# Patient Record
Sex: MALE | Race: WHITE | NOT HISPANIC OR LATINO | Employment: OTHER | URBAN - METROPOLITAN AREA
[De-identification: names, ages, dates, MRNs, and addresses within clinical notes are randomized per-mention and may not be internally consistent; named-entity substitution may affect disease eponyms.]

---

## 2017-07-13 ENCOUNTER — APPOINTMENT (OUTPATIENT)
Dept: CT IMAGING | Facility: HOSPITAL | Age: 82
End: 2017-07-13

## 2017-07-13 ENCOUNTER — APPOINTMENT (OUTPATIENT)
Dept: GENERAL RADIOLOGY | Facility: HOSPITAL | Age: 82
End: 2017-07-13

## 2017-07-13 ENCOUNTER — HOSPITAL ENCOUNTER (INPATIENT)
Facility: HOSPITAL | Age: 82
LOS: 2 days | Discharge: HOME HEALTH | End: 2017-07-15
Attending: EMERGENCY MEDICINE | Admitting: INTERNAL MEDICINE

## 2017-07-13 DIAGNOSIS — R55 SYNCOPE AND COLLAPSE: ICD-10-CM

## 2017-07-13 DIAGNOSIS — R26.2 DIFFICULTY WALKING: ICD-10-CM
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
## 2017-07-13 DIAGNOSIS — S09.90XA HEAD INJURY, INITIAL ENCOUNTER: ICD-10-CM

## 2017-07-13 DIAGNOSIS — N39.0 ACUTE UTI: ICD-10-CM

## 2017-07-13 DIAGNOSIS — D72.829 LEUKOCYTOSIS, UNSPECIFIED TYPE: ICD-10-CM

## 2017-07-13 DIAGNOSIS — R55 SYNCOPE, UNSPECIFIED SYNCOPE TYPE: ICD-10-CM

## 2017-07-13 DIAGNOSIS — S50.311A ELBOW ABRASION, RIGHT, INITIAL ENCOUNTER: ICD-10-CM

## 2017-07-13 PROBLEM — T79.6XXA TRAUMATIC RHABDOMYOLYSIS (HCC): Status: ACTIVE | Noted: 2017-07-13

## 2017-07-13 PROBLEM — Z95.0 PACEMAKER, ARTIFICIAL: Status: ACTIVE | Noted: 2017-07-13

## 2017-07-13 PROBLEM — A41.9 SEPSIS DUE TO URINARY TRACT INFECTION (HCC): Status: ACTIVE | Noted: 2017-07-13

## 2017-07-13 PROBLEM — I25.5 ISCHEMIC CARDIOMYOPATHY: Status: ACTIVE | Noted: 2017-07-13

## 2017-07-13 LAB
ABO GROUP BLD: NORMAL
ALBUMIN SERPL-MCNC: 3.5 G/DL (ref 3.5–5.2)
ALBUMIN/GLOB SERPL: 1.3 G/DL
ALP SERPL-CCNC: 75 U/L (ref 39–117)
ALT SERPL W P-5'-P-CCNC: 25 U/L (ref 1–41)
ANION GAP SERPL CALCULATED.3IONS-SCNC: 11.7 MMOL/L
AST SERPL-CCNC: 45 U/L (ref 1–40)
BACTERIA UR QL AUTO: ABNORMAL /HPF
BASOPHILS # BLD AUTO: 0.01 10*3/MM3 (ref 0–0.2)
BASOPHILS NFR BLD AUTO: 0 % (ref 0–1.5)
BILIRUB SERPL-MCNC: 0.5 MG/DL (ref 0.1–1.2)
BILIRUB UR QL STRIP: NEGATIVE
BLD GP AB SCN SERPL QL: NEGATIVE
BUN BLD-MCNC: 19 MG/DL (ref 8–23)
BUN/CREAT SERPL: 17.8 (ref 7–25)
CALCIUM SPEC-SCNC: 9.7 MG/DL (ref 8.6–10.5)
CHLORIDE SERPL-SCNC: 102 MMOL/L (ref 98–107)
CK SERPL-CCNC: 484 U/L (ref 20–200)
CLARITY UR: ABNORMAL
CO2 SERPL-SCNC: 27.3 MMOL/L (ref 22–29)
COLOR UR: ABNORMAL
CREAT BLD-MCNC: 1.07 MG/DL (ref 0.76–1.27)
D-LACTATE SERPL-SCNC: 1.1 MMOL/L (ref 0.5–2)
D-LACTATE SERPL-SCNC: 1.5 MMOL/L (ref 0.5–2)
DEPRECATED RDW RBC AUTO: 48.9 FL (ref 37–54)
EOSINOPHIL # BLD AUTO: 0 10*3/MM3 (ref 0–0.7)
EOSINOPHIL NFR BLD AUTO: 0 % (ref 0.3–6.2)
ERYTHROCYTE [DISTWIDTH] IN BLOOD BY AUTOMATED COUNT: 14.6 % (ref 11.5–14.5)
GFR SERPL CREATININE-BSD FRML MDRD: 66 ML/MIN/1.73
GLOBULIN UR ELPH-MCNC: 2.8 GM/DL
GLUCOSE BLD-MCNC: 121 MG/DL (ref 65–99)
GLUCOSE UR STRIP-MCNC: NEGATIVE MG/DL
HCT VFR BLD AUTO: 43.7 % (ref 40.4–52.2)
HGB BLD-MCNC: 14.1 G/DL (ref 13.7–17.6)
HGB UR QL STRIP.AUTO: NEGATIVE
HYALINE CASTS UR QL AUTO: ABNORMAL /LPF
IMM GRANULOCYTES # BLD: 0.06 10*3/MM3 (ref 0–0.03)
IMM GRANULOCYTES NFR BLD: 0.3 % (ref 0–0.5)
INR PPP: 1.25 (ref 0.9–1.1)
KETONES UR QL STRIP: ABNORMAL
LEUKOCYTE ESTERASE UR QL STRIP.AUTO: ABNORMAL
LYMPHOCYTES # BLD AUTO: 1.16 10*3/MM3 (ref 0.9–4.8)
LYMPHOCYTES NFR BLD AUTO: 4.8 % (ref 19.6–45.3)
MAGNESIUM SERPL-MCNC: 2.2 MG/DL (ref 1.6–2.4)
MCH RBC QN AUTO: 29.4 PG (ref 27–32.7)
MCHC RBC AUTO-ENTMCNC: 32.3 G/DL (ref 32.6–36.4)
MCV RBC AUTO: 91 FL (ref 79.8–96.2)
MONOCYTES # BLD AUTO: 0.66 10*3/MM3 (ref 0.2–1.2)
MONOCYTES NFR BLD AUTO: 2.8 % (ref 5–12)
NEUTROPHILS # BLD AUTO: 22.07 10*3/MM3 (ref 1.9–8.1)
NEUTROPHILS NFR BLD AUTO: 92.1 % (ref 42.7–76)
NITRITE UR QL STRIP: NEGATIVE
NT-PROBNP SERPL-MCNC: 2558 PG/ML (ref 0–1800)
PH UR STRIP.AUTO: 8 [PH] (ref 5–8)
PLATELET # BLD AUTO: 174 10*3/MM3 (ref 140–500)
PMV BLD AUTO: 10.3 FL (ref 6–12)
POTASSIUM BLD-SCNC: 4.2 MMOL/L (ref 3.5–5.2)
PROT SERPL-MCNC: 6.3 G/DL (ref 6–8.5)
PROT UR QL STRIP: ABNORMAL
PROTHROMBIN TIME: 15.2 SECONDS (ref 11.7–14.2)
RBC # BLD AUTO: 4.8 10*6/MM3 (ref 4.6–6)
RBC # UR: ABNORMAL /HPF
REF LAB TEST METHOD: ABNORMAL
RH BLD: POSITIVE
SODIUM BLD-SCNC: 141 MMOL/L (ref 136–145)
SP GR UR STRIP: 1.02 (ref 1–1.03)
SQUAMOUS #/AREA URNS HPF: ABNORMAL /HPF
TROPONIN T SERPL-MCNC: <0.01 NG/ML (ref 0–0.03)
UROBILINOGEN UR QL STRIP: ABNORMAL
WBC NRBC COR # BLD: 23.96 10*3/MM3 (ref 4.5–10.7)
WBC UR QL AUTO: ABNORMAL /HPF

## 2017-07-13 PROCEDURE — 85610 PROTHROMBIN TIME: CPT | Performed by: EMERGENCY MEDICINE

## 2017-07-13 PROCEDURE — 87040 BLOOD CULTURE FOR BACTERIA: CPT | Performed by: INTERNAL MEDICINE

## 2017-07-13 PROCEDURE — 36415 COLL VENOUS BLD VENIPUNCTURE: CPT | Performed by: INTERNAL MEDICINE

## 2017-07-13 PROCEDURE — 83605 ASSAY OF LACTIC ACID: CPT | Performed by: EMERGENCY MEDICINE

## 2017-07-13 PROCEDURE — 81001 URINALYSIS AUTO W/SCOPE: CPT | Performed by: EMERGENCY MEDICINE

## 2017-07-13 PROCEDURE — 25010000003 CEFTRIAXONE PER 250 MG: Performed by: EMERGENCY MEDICINE

## 2017-07-13 PROCEDURE — 25010000002 CEFTRIAXONE PER 250 MG: Performed by: INTERNAL MEDICINE

## 2017-07-13 PROCEDURE — 70450 CT HEAD/BRAIN W/O DYE: CPT

## 2017-07-13 PROCEDURE — 84484 ASSAY OF TROPONIN QUANT: CPT | Performed by: EMERGENCY MEDICINE

## 2017-07-13 PROCEDURE — 25010000002 TDAP 5-2.5-18.5 LF-MCG/0.5 SUSPENSION: Performed by: EMERGENCY MEDICINE

## 2017-07-13 PROCEDURE — 85025 COMPLETE CBC W/AUTO DIFF WBC: CPT | Performed by: EMERGENCY MEDICINE

## 2017-07-13 PROCEDURE — 87086 URINE CULTURE/COLONY COUNT: CPT | Performed by: EMERGENCY MEDICINE

## 2017-07-13 PROCEDURE — 86900 BLOOD TYPING SEROLOGIC ABO: CPT | Performed by: EMERGENCY MEDICINE

## 2017-07-13 PROCEDURE — 25010000002 ENOXAPARIN PER 10 MG: Performed by: INTERNAL MEDICINE

## 2017-07-13 PROCEDURE — 99285 EMERGENCY DEPT VISIT HI MDM: CPT

## 2017-07-13 PROCEDURE — 83605 ASSAY OF LACTIC ACID: CPT | Performed by: INTERNAL MEDICINE

## 2017-07-13 PROCEDURE — 86850 RBC ANTIBODY SCREEN: CPT | Performed by: EMERGENCY MEDICINE

## 2017-07-13 PROCEDURE — 71020 HC CHEST PA AND LATERAL: CPT

## 2017-07-13 PROCEDURE — 90471 IMMUNIZATION ADMIN: CPT | Performed by: EMERGENCY MEDICINE

## 2017-07-13 PROCEDURE — 80053 COMPREHEN METABOLIC PANEL: CPT | Performed by: EMERGENCY MEDICINE

## 2017-07-13 PROCEDURE — 86901 BLOOD TYPING SEROLOGIC RH(D): CPT | Performed by: EMERGENCY MEDICINE

## 2017-07-13 PROCEDURE — 83880 ASSAY OF NATRIURETIC PEPTIDE: CPT | Performed by: EMERGENCY MEDICINE

## 2017-07-13 PROCEDURE — 90715 TDAP VACCINE 7 YRS/> IM: CPT | Performed by: EMERGENCY MEDICINE

## 2017-07-13 PROCEDURE — 70486 CT MAXILLOFACIAL W/O DYE: CPT

## 2017-07-13 PROCEDURE — 82550 ASSAY OF CK (CPK): CPT | Performed by: EMERGENCY MEDICINE

## 2017-07-13 PROCEDURE — 93010 ELECTROCARDIOGRAM REPORT: CPT | Performed by: INTERNAL MEDICINE

## 2017-07-13 PROCEDURE — 93005 ELECTROCARDIOGRAM TRACING: CPT | Performed by: EMERGENCY MEDICINE

## 2017-07-13 PROCEDURE — 83735 ASSAY OF MAGNESIUM: CPT | Performed by: EMERGENCY MEDICINE

## 2017-07-13 RX ORDER — SODIUM CHLORIDE 0.9 % (FLUSH) 0.9 %
10 SYRINGE (ML) INJECTION AS NEEDED
Status: DISCONTINUED | OUTPATIENT
Start: 2017-07-13 | End: 2017-07-13

## 2017-07-13 RX ORDER — LORATADINE 10 MG/1
10 TABLET ORAL
COMMUNITY
End: 2017-07-13 | Stop reason: SDUPTHER

## 2017-07-13 RX ORDER — SODIUM CHLORIDE 9 MG/ML
125 INJECTION, SOLUTION INTRAVENOUS CONTINUOUS
Status: ACTIVE | OUTPATIENT
Start: 2017-07-13 | End: 2017-07-13

## 2017-07-13 RX ORDER — ALBUTEROL SULFATE 90 UG/1
2 AEROSOL, METERED RESPIRATORY (INHALATION) EVERY 6 HOURS PRN
COMMUNITY

## 2017-07-13 RX ORDER — ATORVASTATIN CALCIUM 20 MG/1
40 TABLET, FILM COATED ORAL DAILY
Status: DISCONTINUED | OUTPATIENT
Start: 2017-07-13 | End: 2017-07-15 | Stop reason: HOSPADM

## 2017-07-13 RX ORDER — FUROSEMIDE 20 MG/1
20 TABLET ORAL DAILY
COMMUNITY

## 2017-07-13 RX ORDER — CETIRIZINE HYDROCHLORIDE 10 MG/1
5 TABLET ORAL DAILY
Status: DISCONTINUED | OUTPATIENT
Start: 2017-07-13 | End: 2017-07-15 | Stop reason: HOSPADM

## 2017-07-13 RX ORDER — FINASTERIDE 5 MG/1
5 TABLET, FILM COATED ORAL DAILY
Status: DISCONTINUED | OUTPATIENT
Start: 2017-07-13 | End: 2017-07-15 | Stop reason: HOSPADM

## 2017-07-13 RX ORDER — CEFTRIAXONE SODIUM 1 G/50ML
1 INJECTION, SOLUTION INTRAVENOUS EVERY 24 HOURS
Status: DISCONTINUED | OUTPATIENT
Start: 2017-07-13 | End: 2017-07-15 | Stop reason: HOSPADM

## 2017-07-13 RX ORDER — FERROUS SULFATE 325(65) MG
325 TABLET ORAL DAILY
Status: DISCONTINUED | OUTPATIENT
Start: 2017-07-13 | End: 2017-07-15 | Stop reason: HOSPADM

## 2017-07-13 RX ORDER — LEVOTHYROXINE SODIUM 0.03 MG/1
25 TABLET ORAL DAILY
COMMUNITY

## 2017-07-13 RX ORDER — METOPROLOL SUCCINATE 25 MG/1
25 TABLET, EXTENDED RELEASE ORAL DAILY
Status: DISCONTINUED | OUTPATIENT
Start: 2017-07-13 | End: 2017-07-15 | Stop reason: HOSPADM

## 2017-07-13 RX ORDER — NITROGLYCERIN 0.4 MG/1
0.4 TABLET SUBLINGUAL
Status: DISCONTINUED | OUTPATIENT
Start: 2017-07-13 | End: 2017-07-15 | Stop reason: HOSPADM

## 2017-07-13 RX ORDER — METOPROLOL SUCCINATE 25 MG/1
25 TABLET, EXTENDED RELEASE ORAL DAILY
COMMUNITY

## 2017-07-13 RX ORDER — PANTOPRAZOLE SODIUM 20 MG/1
20 TABLET, DELAYED RELEASE ORAL DAILY
COMMUNITY

## 2017-07-13 RX ORDER — CALCIUM CARBONATE 200(500)MG
2 TABLET,CHEWABLE ORAL 2 TIMES DAILY PRN
Status: DISCONTINUED | OUTPATIENT
Start: 2017-07-13 | End: 2017-07-15 | Stop reason: HOSPADM

## 2017-07-13 RX ORDER — SODIUM CHLORIDE 9 MG/ML
125 INJECTION, SOLUTION INTRAVENOUS CONTINUOUS
Status: DISCONTINUED | OUTPATIENT
Start: 2017-07-13 | End: 2017-07-13

## 2017-07-13 RX ORDER — METOPROLOL SUCCINATE 25 MG/1
25 TABLET, EXTENDED RELEASE ORAL
COMMUNITY
Start: 2013-02-15 | End: 2017-07-13 | Stop reason: SDUPTHER

## 2017-07-13 RX ORDER — FINASTERIDE 5 MG/1
5 TABLET, FILM COATED ORAL DAILY
COMMUNITY

## 2017-07-13 RX ORDER — LEVOTHYROXINE SODIUM 0.03 MG/1
25 TABLET ORAL
COMMUNITY
End: 2017-07-13 | Stop reason: SDUPTHER

## 2017-07-13 RX ORDER — ATORVASTATIN CALCIUM 40 MG/1
40 TABLET, FILM COATED ORAL
COMMUNITY
End: 2017-07-13 | Stop reason: SDUPTHER

## 2017-07-13 RX ORDER — PANTOPRAZOLE SODIUM 20 MG/1
20 TABLET, DELAYED RELEASE ORAL DAILY
Status: DISCONTINUED | OUTPATIENT
Start: 2017-07-13 | End: 2017-07-15 | Stop reason: HOSPADM

## 2017-07-13 RX ORDER — SODIUM CHLORIDE 0.9 % (FLUSH) 0.9 %
1-10 SYRINGE (ML) INJECTION AS NEEDED
Status: DISCONTINUED | OUTPATIENT
Start: 2017-07-13 | End: 2017-07-15 | Stop reason: HOSPADM

## 2017-07-13 RX ORDER — LEVOTHYROXINE SODIUM 0.03 MG/1
25 TABLET ORAL DAILY
Status: DISCONTINUED | OUTPATIENT
Start: 2017-07-13 | End: 2017-07-15 | Stop reason: HOSPADM

## 2017-07-13 RX ORDER — ALBUTEROL SULFATE 90 UG/1
2 AEROSOL, METERED RESPIRATORY (INHALATION) EVERY 6 HOURS PRN
COMMUNITY
End: 2017-07-13 | Stop reason: SDUPTHER

## 2017-07-13 RX ORDER — ONDANSETRON 4 MG/1
4 TABLET, ORALLY DISINTEGRATING ORAL EVERY 6 HOURS PRN
Status: DISCONTINUED | OUTPATIENT
Start: 2017-07-13 | End: 2017-07-15 | Stop reason: HOSPADM

## 2017-07-13 RX ORDER — ASPIRIN 81 MG/1
81 TABLET ORAL DAILY
Status: DISCONTINUED | OUTPATIENT
Start: 2017-07-13 | End: 2017-07-15 | Stop reason: HOSPADM

## 2017-07-13 RX ORDER — GUAIFENESIN 600 MG/1
600 TABLET, EXTENDED RELEASE ORAL DAILY
COMMUNITY

## 2017-07-13 RX ORDER — CEFTRIAXONE SODIUM 2 G/50ML
2 INJECTION, SOLUTION INTRAVENOUS ONCE
Status: COMPLETED | OUTPATIENT
Start: 2017-07-13 | End: 2017-07-13

## 2017-07-13 RX ORDER — ATORVASTATIN CALCIUM 40 MG/1
40 TABLET, FILM COATED ORAL DAILY
COMMUNITY

## 2017-07-13 RX ORDER — LORATADINE 10 MG/1
10 TABLET ORAL DAILY
COMMUNITY

## 2017-07-13 RX ORDER — ONDANSETRON 2 MG/ML
4 INJECTION INTRAMUSCULAR; INTRAVENOUS EVERY 6 HOURS PRN
Status: DISCONTINUED | OUTPATIENT
Start: 2017-07-13 | End: 2017-07-15 | Stop reason: HOSPADM

## 2017-07-13 RX ORDER — NITROGLYCERIN 0.4 MG/1
0.4 TABLET SUBLINGUAL
COMMUNITY

## 2017-07-13 RX ORDER — ALBUTEROL SULFATE 2.5 MG/3ML
2.5 SOLUTION RESPIRATORY (INHALATION) EVERY 6 HOURS PRN
Status: DISCONTINUED | OUTPATIENT
Start: 2017-07-13 | End: 2017-07-15 | Stop reason: HOSPADM

## 2017-07-13 RX ORDER — FERROUS SULFATE 325(65) MG
325 TABLET ORAL DAILY
COMMUNITY

## 2017-07-13 RX ORDER — FUROSEMIDE 20 MG/1
20 TABLET ORAL
COMMUNITY
End: 2017-07-13 | Stop reason: SDUPTHER

## 2017-07-13 RX ORDER — ONDANSETRON 4 MG/1
4 TABLET, FILM COATED ORAL EVERY 6 HOURS PRN
Status: DISCONTINUED | OUTPATIENT
Start: 2017-07-13 | End: 2017-07-15 | Stop reason: HOSPADM

## 2017-07-13 RX ORDER — GUAIFENESIN 600 MG/1
600 TABLET, EXTENDED RELEASE ORAL DAILY
Status: DISCONTINUED | OUTPATIENT
Start: 2017-07-13 | End: 2017-07-15 | Stop reason: HOSPADM

## 2017-07-13 RX ORDER — ASPIRIN 81 MG/1
81 TABLET ORAL DAILY
COMMUNITY

## 2017-07-13 RX ADMIN — CALCIUM CARBONATE-VITAMIN D TAB 500 MG-200 UNIT 500 MG: 500-200 TAB at 19:56

## 2017-07-13 RX ADMIN — FINASTERIDE 5 MG: 5 TABLET, FILM COATED ORAL at 19:56

## 2017-07-13 RX ADMIN — METOPROLOL SUCCINATE 25 MG: 25 TABLET, FILM COATED, EXTENDED RELEASE ORAL at 19:55

## 2017-07-13 RX ADMIN — SODIUM CHLORIDE 125 ML/HR: 9 INJECTION, SOLUTION INTRAVENOUS at 18:47

## 2017-07-13 RX ADMIN — ATORVASTATIN CALCIUM 40 MG: 20 TABLET, FILM COATED ORAL at 19:56

## 2017-07-13 RX ADMIN — PANTOPRAZOLE SODIUM 20 MG: 20 TABLET, DELAYED RELEASE ORAL at 19:57

## 2017-07-13 RX ADMIN — ASPIRIN 81 MG: 81 TABLET ORAL at 19:56

## 2017-07-13 RX ADMIN — CETIRIZINE HYDROCHLORIDE 5 MG: 10 TABLET, FILM COATED ORAL at 19:56

## 2017-07-13 RX ADMIN — TETANUS TOXOID, REDUCED DIPHTHERIA TOXOID AND ACELLULAR PERTUSSIS VACCINE, ADSORBED 0.5 ML: 5; 2.5; 8; 8; 2.5 SUSPENSION INTRAMUSCULAR at 11:26

## 2017-07-13 RX ADMIN — CEFTRIAXONE SODIUM 2 G: 2 INJECTION, SOLUTION INTRAVENOUS at 15:02

## 2017-07-13 RX ADMIN — CEFTRIAXONE SODIUM 1 G: 1 INJECTION, SOLUTION INTRAVENOUS at 18:42

## 2017-07-13 RX ADMIN — SODIUM CHLORIDE 500 ML: 9 INJECTION, SOLUTION INTRAVENOUS at 11:32

## 2017-07-13 RX ADMIN — ENOXAPARIN SODIUM 40 MG: 40 INJECTION SUBCUTANEOUS at 18:42

## 2017-07-13 RX ADMIN — GUAIFENESIN 600 MG: 600 TABLET, EXTENDED RELEASE ORAL at 19:55

## 2017-07-13 RX ADMIN — FERROUS SULFATE TAB 325 MG (65 MG ELEMENTAL FE) 325 MG: 325 (65 FE) TAB at 19:55

## 2017-07-13 RX ADMIN — LEVOTHYROXINE SODIUM 25 MCG: 25 TABLET ORAL at 19:55

## 2017-07-13 RX ADMIN — SODIUM CHLORIDE 500 ML: 9 INJECTION, SOLUTION INTRAVENOUS at 14:54

## 2017-07-13 NOTE — H&P
Name: Macario Willingham ADMIT: 2017   : 1931  PCP: Idalia Iyer MD    MRN: [id number] LOS: 0 days   AGE/SEX: 85 y.o. male  ROOM:      Chief Complaint   Patient presents with   • Fall     fell last night, patient stated he just remebers waking up in the floor next to bed. patient stated he feels weak this morning.        Subjective   Mr. Willingham is a 85 y.o. male who presents to [facility] following an unwitnessed fall which occurred at about 3AM today.  He states he remembers trying to get out of bed to go to the bathroom, then waking up on the floor. He does not remember falling or having had dizziness, chest pain, palpitations, of visual changes prior.  He has had some dysuria for the past few days but no fever or chills.  No recent antibiotics.    History of Present Illness    Past Medical History:   Diagnosis Date   • Arthritis    • Cancer     skin   • CHF (congestive heart failure)    • COPD (chronic obstructive pulmonary disease)    • Depression    • GERD (gastroesophageal reflux disease)    • Hypertension    • Myocardial infarction      Past Surgical History:   Procedure Laterality Date   • CARDIAC SURGERY      stents   • HERNIA REPAIR     • PACEMAKER IMPLANTATION       History reviewed. No pertinent family history.  Social History   Substance Use Topics   • Smoking status: Former Smoker   • Smokeless tobacco: None   • Alcohol use Yes      Comment: occ       (Not in a hospital admission)  Allergies:  Lisinopril and Penicillins    Review of Systems   Constitutional: Positive for fatigue. Negative for chills and fever.   HENT: Negative for congestion, nosebleeds, sore throat and trouble swallowing.    Eyes: Negative for redness and visual disturbance.   Respiratory: Negative for cough, choking and shortness of breath.    Cardiovascular: Negative for chest pain, palpitations and leg swelling.   Gastrointestinal: Negative for abdominal pain, constipation, diarrhea,  nausea and vomiting.   Endocrine: Negative for cold intolerance and heat intolerance.   Genitourinary: Positive for difficulty urinating and dysuria. Negative for decreased urine volume, flank pain and hematuria.   Musculoskeletal: Negative for arthralgias, myalgias and neck pain.   Skin: Negative for pallor and rash.   Neurological: Negative for dizziness, light-headedness and headaches.   Hematological: Negative for adenopathy. Does not bruise/bleed easily.   Psychiatric/Behavioral: Negative for confusion and decreased concentration.        Objective    Vital Signs  Temp:  [98 °F (36.7 °C)] 98 °F (36.7 °C)  Heart Rate:  [70-80] 76  Resp:  [19-22] 19  BP: ()/(48-62) 91/51  SpO2:  [90 %-100 %] 100 %  on   ;   O2 Device: room air  Body mass index is 20.36 kg/(m^2).    Physical Exam   Constitutional: He is oriented to person, place, and time. No distress.   HENT:   Head: Normocephalic.   Mouth/Throat: Oropharynx is clear and moist.   Eyes: Conjunctivae and EOM are normal. Pupils are equal, round, and reactive to light. No scleral icterus.   Neck: Normal range of motion. Neck supple. No JVD present.   Cardiovascular: Normal rate and regular rhythm.    Pulmonary/Chest: Effort normal and breath sounds normal.   Abdominal: Soft. Bowel sounds are normal. There is no tenderness.   Musculoskeletal: He exhibits no edema or tenderness.   Neurological: He is alert and oriented to person, place, and time. No cranial nerve deficit. He exhibits normal muscle tone.   Skin: Skin is warm and dry. He is not diaphoretic.   Psychiatric: He has a normal mood and affect. His behavior is normal.   Nursing note and vitals reviewed.      Results Review:   I reviewed the patient's new clinical results.    Results from last 7 days  Lab Units 07/13/17  1124   WBC 10*3/mm3 23.96*   HEMOGLOBIN g/dL 14.1   PLATELETS 10*3/mm3 174       Results from last 7 days  Lab Units 07/13/17  1124   SODIUM mmol/L 141   POTASSIUM mmol/L 4.2   CHLORIDE  mmol/L 102   CO2 mmol/L 27.3   BUN mg/dL 19   CREATININE mg/dL 1.07   GLUCOSE mg/dL 121*   ALBUMIN g/dL 3.50   BILIRUBIN mg/dL 0.5   ALK PHOS U/L 75   AST (SGOT) U/L 45*   ALT (SGPT) U/L 25   Estimated Creatinine Clearance: 42.1 mL/min (by C-G formula based on Cr of 1.07).    Results from last 7 days  Lab Units 07/13/17  1124   INR  1.25*   CK TOTAL U/L 484*   TROPONIN T ng/mL <0.010   PROBNP pg/mL 2558.0*         Results from last 7 days  Lab Units 07/13/17  1148   NITRITE UA  Negative   WBC UA /HPF Too Numerous to Count*   BACTERIA UA /HPF 4+*   SQUAM EPITHEL UA /HPF None Seen       XR Chest 2 View   Final Result      CT Head Without Contrast         CT Facial Bones Without Contrast           Assessment/Plan   Assessment:     Active Hospital Problems (** Indicates Principal Problem)    Diagnosis Date Noted   • **Sepsis due to urinary tract infection [A41.9, N39.0] 07/13/2017   • Fall [W19.XXXA] 07/13/2017   • Pacemaker, artificial [Z95.0] 07/13/2017   • Ischemic cardiomyopathy [I25.5] 07/13/2017   • Syncope [R55] 07/13/2017   • Traumatic rhabdomyolysis [T79.6XXA] 07/13/2017      Resolved Hospital Problems    Diagnosis Date Noted Date Resolved   No resolved problems to display.         Plan:   Sepsis due to urinary tract infection  -WBC 23,000, respiratory rate 22 on admission  -given rocephin IV in the ER, will continue  -lactate is pending  -give gentle IVF; has reported history of CHF from [facility] and sees [place] [facility] for this but I am unable to find an EF in their records; will monitor volume status  -blood and urine cultures are pending    Syncope  -likely this is orthostatic from dehydration related to sepsis  -will check orthostatics  -patient also has [employer] pacemaker which I will have interrogated  -PT to see    Rhabdomyolysis  -traumatic  -will give gentle IVF, check CK in AM    DVT Prophylaxis  -lovenox    Code Status  -I discussed with the patient and he is full code. Healthcare surrogate  is his daughter, Guzman Willingham.      I discussed the patients findings and my recommendations with patient, family and nursing staff.      Brian Chew MD  [facility]  07/13/17  3:13 PM

## 2017-07-13 NOTE — ED TRIAGE NOTES
Family at bedside, stated at 5:00am patient called another family member to help him off the floor next to bed.

## 2017-07-13 NOTE — ED PROVIDER NOTES
EMERGENCY DEPARTMENT ENCOUNTER    CHIEF COMPLAINT  Chief Complaint: fall  History given by: pt   History limited by: none   Room Number: 25/25  PMD: MD Dr. Elle Sharma, cardiologist    HPI:  Pt is a 85 y.o. male who presents s/p fall last night. Pt states that he woke up next to his bed, but does not remember how he got to the floor nor how long he was there.  Pt state he lives with his family. Pt also c/o abrasions to his R elbow, and fecal incontinence at some point while unconscious. Pt denies focal weakness, neck/back pain, CP, SOA, recent cough/fever, vomiting. Pt states a Hx of cardiac stents.  Pt denies recent antibiotic use or hospital visits.     Duration:  Since last night   Onset: unknown  Timing: episodic  Quality: fall  Intensity/Severity: moderate   Progression: currently resolved   Associated Symptoms: abrasions   Aggravating Factors: none stated   Alleviating Factors: none stated   Previous Episodes: none stated   Treatment before arrival: none    PAST MEDICAL HISTORY  Active Ambulatory Problems     Diagnosis Date Noted   • No Active Ambulatory Problems     Resolved Ambulatory Problems     Diagnosis Date Noted   • No Resolved Ambulatory Problems     Past Medical History:   Diagnosis Date   • Arthritis    • Cancer    • CHF (congestive heart failure)    • COPD (chronic obstructive pulmonary disease)    • Depression    • GERD (gastroesophageal reflux disease)    • Hypertension    • Myocardial infarction        PAST SURGICAL HISTORY  Past Surgical History:   Procedure Laterality Date   • CARDIAC SURGERY      stents   • HERNIA REPAIR     • PACEMAKER IMPLANTATION         FAMILY HISTORY  History reviewed. No pertinent family history.    SOCIAL HISTORY  Social History     Social History   • Marital status:      Spouse name: N/A   • Number of children: N/A   • Years of education: N/A     Occupational History   • Not on file.     Social History Main Topics   • Smoking status:  Former Smoker   • Smokeless tobacco: Not on file   • Alcohol use Yes      Comment: occ   • Drug use: No   • Sexual activity: Not on file     Other Topics Concern   • Not on file     Social History Narrative   • No narrative on file       ALLERGIES  Lisinopril and Penicillins    REVIEW OF SYSTEMS  Review of Systems   Constitutional: Negative for activity change, appetite change and fever.        Fall   HENT: Negative for congestion and sore throat.    Eyes: Negative.    Respiratory: Negative for cough and shortness of breath.    Cardiovascular: Negative for chest pain and leg swelling.   Gastrointestinal: Negative for abdominal pain, diarrhea and vomiting.   Endocrine: Negative.    Genitourinary: Negative for decreased urine volume and dysuria.        Fecal icontinence   Musculoskeletal: Negative for neck pain.   Skin: Positive for wound (abrasion, R elbow). Negative for rash.   Allergic/Immunologic: Negative.    Neurological: Negative for weakness, numbness and headaches.   Hematological: Negative.    Psychiatric/Behavioral: Negative.    All other systems reviewed and are negative.      PHYSICAL EXAM  ED Triage Vitals   Temp Heart Rate Resp BP SpO2   07/13/17 0959 07/13/17 0958 07/13/17 0958 07/13/17 0958 07/13/17 0958   98 °F (36.7 °C) 80 22 100/53 99 %      Temp src Heart Rate Source Patient Position BP Location FiO2 (%)   07/13/17 0959 -- -- -- --   Tympanic           Physical Exam   Constitutional: He is oriented to person, place, and time. He appears distressed.   HENT:   Head: Normocephalic and atraumatic.   Eyes: EOM are normal.   Neck: Normal range of motion. No spinous process tenderness present.   Cardiovascular: Normal rate, regular rhythm and normal heart sounds.    No murmur heard.  Pulses:       Posterior tibial pulses are 2+ on the right side, and 2+ on the left side.   Pulmonary/Chest: Effort normal. No respiratory distress. He has decreased breath sounds (mild, bilateral). He has no wheezes. He has  rhonchi (bases). He exhibits no tenderness.   Abdominal: Soft. Bowel sounds are normal. There is no tenderness. There is no rebound and no guarding.   Musculoskeletal: Normal range of motion. He exhibits edema (1+ bilateral lower extremities). He exhibits no deformity.   C, T, and L-spine nontender, normal ROM of extremities    Neurological: He is alert and oriented to person, place, and time.   Skin: Skin is warm and dry. Abrasion (Larteral aspect of R elbow), bruising (lateral aspect of R elbow) and ecchymosis (lateral aspect of R eye) noted. No laceration noted.   Dried brown debris around mouth and R nares. Dried stool on the pt's legs.   Psychiatric: Affect normal.   Nursing note and vitals reviewed.      LAB RESULTS  Lab Results (last 24 hours)     Procedure Component Value Units Date/Time    CBC & Differential [[id number]] Collected:  07/13/17 1124    Specimen:  Blood Updated:  07/13/17 1150    Narrative:       The following orders were created for panel order CBC & Differential.  Procedure                               Abnormality         Status                     ---------                               -----------         ------                     CBC Auto Differential[[id number]]        Abnormal            Final result                 Please view results for these tests on the individual orders.    Comprehensive Metabolic Panel [[id number]]  (Abnormal) Collected:  07/13/17 1124    Specimen:  Blood Updated:  07/13/17 1209     Glucose 121 (H) mg/dL      BUN 19 mg/dL      Creatinine 1.07 mg/dL      Sodium 141 mmol/L      Potassium 4.2 mmol/L      Chloride 102 mmol/L      CO2 27.3 mmol/L      Calcium 9.7 mg/dL      Total Protein 6.3 g/dL      Albumin 3.50 g/dL      ALT (SGPT) 25 U/L      AST (SGOT) 45 (H) U/L      Alkaline Phosphatase 75 U/L      Total Bilirubin 0.5 mg/dL      eGFR Non African Amer 66 mL/min/1.73      Globulin 2.8 gm/dL      A/G Ratio 1.3 g/dL      BUN/Creatinine Ratio 17.8     Anion Gap 11.7  mmol/L     Narrative:       The MDRD GFR formula is only valid for adults with stable renal function between ages 18 and 70.    CK [[id number]]  (Abnormal) Collected:  07/13/17 1124    Specimen:  Blood Updated:  07/13/17 1209     Creatine Kinase 484 (H) U/L     Protime-INR [[id number]]  (Abnormal) Collected:  07/13/17 1124    Specimen:  Blood Updated:  07/13/17 1204     Protime 15.2 (H) Seconds      INR 1.25 (H)    Troponin [[id number]]  (Normal) Collected:  07/13/17 1124    Specimen:  Blood Updated:  07/13/17 1209     Troponin T <0.010 ng/mL     Narrative:       Troponin T Reference Ranges:  Less than 0.03 ng/mL:    Negative for AMI  0.03 to 0.09 ng/mL:      Indeterminant for AMI  Greater than 0.09 ng/mL: Positive for AMI    Magnesium [[id number]]  (Normal) Collected:  07/13/17 1124    Specimen:  Blood Updated:  07/13/17 1209     Magnesium 2.2 mg/dL     BNP [[id number]]  (Abnormal) Collected:  07/13/17 1124    Specimen:  Blood Updated:  07/13/17 1208     proBNP 2558.0 (H) pg/mL     Narrative:       Among patients with dyspnea, NT-proBNP is highly sensitive for the detection of acute congestive heart failure. In addition NT-proBNP of <300 pg/ml effectively rules out acute congestive heart failure with 99% negative predictive value.    CBC Auto Differential [[id number]]  (Abnormal) Collected:  07/13/17 1124    Specimen:  Blood Updated:  07/13/17 1150     WBC 23.96 (H) 10*3/mm3      RBC 4.80 10*6/mm3      Hemoglobin 14.1 g/dL      Hematocrit 43.7 %      MCV 91.0 fL      MCH 29.4 pg      MCHC 32.3 (L) g/dL      RDW 14.6 (H) %      RDW-SD 48.9 fl      MPV 10.3 fL      Platelets 174 10*3/mm3      Neutrophil % 92.1 (H) %      Lymphocyte % 4.8 (L) %      Monocyte % 2.8 (L) %      Eosinophil % 0.0 (L) %      Basophil % 0.0 %      Immature Grans % 0.3 %      Neutrophils, Absolute 22.07 (H) 10*3/mm3      Lymphocytes, Absolute 1.16 10*3/mm3      Monocytes, Absolute 0.66 10*3/mm3      Eosinophils, Absolute 0.00 10*3/mm3       Basophils, Absolute 0.01 10*3/mm3      Immature Grans, Absolute 0.06 (H) 10*3/mm3     Urinalysis With / Culture If Indicated [[id number]]  (Abnormal) Collected:  07/13/17 1148    Specimen:  Urine from Urine, Clean Catch Updated:  07/13/17 1229     Color, UA Dark Yellow (A)     Appearance, UA Cloudy (A)     pH, UA 8.0     Specific Gravity, UA 1.019     Glucose, UA Negative     Ketones, UA Trace (A)     Bilirubin, UA Negative     Blood, UA Negative     Protein, UA 30 mg/dL (1+) (A)     Leuk Esterase, UA Large (3+) (A)     Nitrite, UA Negative     Urobilinogen, UA 0.2 E.U./dL    Urinalysis, Microscopic Only [[id number]]  (Abnormal) Collected:  07/13/17 1148    Specimen:  Urine from Urine, Clean Catch Updated:  07/13/17 1229     RBC, UA None Seen /HPF      WBC, UA Too Numerous to Count (A) /HPF      Bacteria, UA 4+ (A) /HPF      Squamous Epithelial Cells, UA None Seen /HPF      Hyaline Casts, UA None Seen /LPF      Methodology Manual Light Microscopy    Urine Culture [[id number]] Collected:  07/13/17 1148    Specimen:  Urine from Urine, Clean Catch Updated:  07/13/17 1158          I ordered the above labs and reviewed the results    RADIOLOGY  XR Chest 2 View   The lungs are somewhat hyperinflated and clear. There is mild  cardiomegaly with a triple lead pacemaker in place. There is also a  small hiatus hernia present. There is no acute disease or change from  11/06/2012.      CT Head Without Contrast   The brain and ventricles are symmetrical.  There is moderate atrophy. Vascular calcification is noted. There is no  evidence of intracranial hemorrhage, hydrocephalus or of abnormal  extra-axial fluid. No focal area of decreased attenuation to suggest  acute infarction is identified.      CT Facial Bones Without Contrast   A CT examination of the facial bones  was performed. Multiplanar reconstructions were also generated.           I ordered the above noted radiological studies. Interpreted by radiologist. Discussed with  radiologist (Dr. Benavidez). Reviewed by me in PACS.       PROCEDURES  Procedures  EKG           EKG time: 1113  Rhythm/Rate: atrially sensed ventricularly paced 70s  QRS, axis: poor R wave progression   ST and T waves: nonspecific ST findings     Interpreted Contemporaneously by me, independently viewed  Improved compared to prior 11/10/12      PROGRESS AND CONSULTS  ED Course   1034  Ordered CXR, CT facial bones, CT head, pacemaker interrogation, and labs for further evaluation, IV fluids for hydration, Tdap for tetanus risk, and wound dressing.   1100  Received a call from Dr. Benavidez and discussed pt's case. Dr. Benavidez described the pt's CT findings, with no abnormalities found.  1158  Ordered urinalysis for further evaluation.   1316  Rechecked pt, who is resting comfortably. Discussed the pt;s negative radiology results, and lab findings of elevated WBC and UTI. Plan to admit the pt for further management. Pt understands and agrees with the plan, and all questions were answered.   1326  Ordered consult to [facility].   1409  Received a call from Dr. Chew and discussed pt's case. Dr. Chew agreed to admit the pt and suggested taking a lactate.   1412  Ordered lactate acid for further evaluation.     MEDICAL DECISION MAKING  Results were reviewed/discussed with the patient and they were also made aware of online access. Pt also made aware that some labs, such as cultures, will not be resulted during ER visit and follow up with PMD is necessary.     MDM  Number of Diagnoses or Management Options  Acute UTI:   Elbow abrasion, right, initial encounter:   Fall, initial encounter:   Leukocytosis, unspecified type:      Amount and/or Complexity of Data Reviewed  Clinical lab tests: reviewed and ordered (Bacteria U 4+, Leukocyte U 3+, , BNP 2558.0, WBC 23.96)  Tests in the radiology section of CPT®: ordered and reviewed (CT head: negative. CT facial bones: negative. CXR: no acute changes)  Tests in the medicine section of  CPT®: reviewed and ordered (See procedures section for EKG)  Discussion of test results with the performing providers: yes (Dr. Benavidez)  Decide to obtain previous medical records or to obtain history from someone other than the patient: yes (Pt had a device check on June 2. Pt has a Hx dilated cardiomyopathy, CHF, and CAD. )  Review and summarize past medical records: yes  Discuss the patient with other providers: yes    Patient Progress  Patient progress: stable         DIAGNOSIS  Final diagnoses:   Fall, initial encounter   Elbow abrasion, right, initial encounter   Leukocytosis, unspecified type   Acute UTI   Head injury, initial encounter   Syncope and collapse       DISPOSITION  ADMISSION by Dr. Chew    Discussed treatment plan and reason for admission with pt/family and admitting physician.  Pt/family voiced understanding of the plan for admission for further testing/treatment as needed.         Latest Documented Vital Signs:  As of 2:14 PM  BP- 103/52 HR- 70 Temp- 98 °F (36.7 °C) (Tympanic) O2 sat- 100%    --  Documentation assistance provided by bere Soto for Dr. Crawford.  Information recorded by the scraris was done at my direction and has been verified and validated by me.     Rei Soto  07/13/17 1811       Vivi Crawford MD  07/14/17 9094

## 2017-07-13 NOTE — ED NOTES
Pt reportedly found on the floor in his bedroom by family this morning. Pt arrvied in bedclothes still on and dried stool covering lower half of body.  Pt has a small bruise by right eye and what appears to be dried brown debris around nose and mouth area.  Pt denies chest pain, shortness of breath, abd pain, fever, chills, recent changes in diet, medication or activity.      Ruma Bales RN  07/13/17 0806

## 2017-07-13 NOTE — PROGRESS NOTES
Clinical Pharmacy Services: Medication History    Macario Willingham is a 85 y.o. male presenting to [facility] Emergency Department with chief complaint of: Fall    Past Medical History:  Past Medical History:   Diagnosis Date   • Arthritis    • Cancer     skin   • CHF (congestive heart failure)    • COPD (chronic obstructive pulmonary disease)    • Depression    • GERD (gastroesophageal reflux disease)    • Hypertension    • Myocardial infarction        Allergies   Allergen Reactions   • Lisinopril Cough   • Penicillins Rash       Medication information was obtained from: Patient reported medication list   Pharmacy and Phone Number:     Medication notes: Patient reported adherence to all medications listed on the PTA     Current Outpatient Medications:    Prior to Admission medications    Medication Sig Start Date End Date Taking? Authorizing Provider   albuterol (PROVENTIL HFA;VENTOLIN HFA) 108 (90 BASE) MCG/ACT inhaler Inhale 2 puffs Every 6 (Six) Hours As Needed for Wheezing.   Yes Historical Provider, MD   aspirin 81 MG EC tablet Take 81 mg by mouth Daily.   Yes Historical Provider, MD   atorvastatin (LIPITOR) 40 MG tablet Take 40 mg by mouth Daily.   Yes Historical Provider, MD   calcium citrate-vitamin d (CITRACAL) 200-250 MG-UNIT tablet tablet Take 1 tablet by mouth Daily.   Yes Historical Provider, MD   ferrous sulfate 325 (65 FE) MG tablet Take 325 mg by mouth Daily.   Yes Historical Provider, MD   finasteride (PROSCAR) 5 MG tablet Take 5 mg by mouth Daily.   Yes Historical Provider, MD   furosemide (LASIX) 20 MG tablet Take 20 mg by mouth Daily.   Yes Historical Provider, MD   guaiFENesin (MUCINEX) 600 MG 12 hr tablet Take 600 mg by mouth Daily.   Yes Historical Provider, MD   levothyroxine (SYNTHROID, LEVOTHROID) 25 MCG tablet Take 25 mcg by mouth Daily.   Yes Historical Provider, MD   loratadine (CLARITIN) 10 MG tablet Take 10 mg by mouth Daily.   Yes Historical Provider, MD   metoprolol succinate XL  (TOPROL-XL) 25 MG 24 hr tablet Take 25 mg by mouth Daily.   Yes Historical Provider, MD   Multiple Vitamins-Minerals (MULTIVITAL) tablet Take 1 tablet by mouth Daily.   Yes Historical Provider, MD   nitroglycerin (NITROSTAT) 0.4 MG SL tablet Place 0.4 mg under the tongue Every 5 (Five) Minutes As Needed for Chest Pain. Take no more than 3 doses in 15 minutes.   Yes Historical Provider, MD   pantoprazole (PROTONIX) 20 MG EC tablet Take 20 mg by mouth Daily.   Yes Historical Provider, MD   tiotropium (SPIRIVA) 18 MCG per inhalation capsule Place 1 capsule into inhaler and inhale Daily.   Yes Historical Provider, MD       This medication list is complete to the best of my knowledge as of 7/13/2017    Please call if questions.     Aleksander Segura, Student Pharmacist

## 2017-07-13 NOTE — PLAN OF CARE
Problem: Patient Care Overview (Adult)  Goal: Plan of Care Review  Outcome: Ongoing (interventions implemented as appropriate)    07/13/17 1821   Coping/Psychosocial Response Interventions   Plan Of Care Reviewed With patient   Patient Care Overview   Progress improving   Outcome Evaluation   Outcome Summary/Follow up Plan Pt admitted today after a fall at home for weakness, fall, UTI, and potential head injury. Only small scrape on back of head. BNP is elevated. Will monitor labs and vitals.        Goal: Adult Individualization and Mutuality  Outcome: Ongoing (interventions implemented as appropriate)  Goal: Discharge Needs Assessment  Outcome: Ongoing (interventions implemented as appropriate)    Problem: Fall Risk (Adult)  Goal: Identify Related Risk Factors and Signs and Symptoms  Outcome: Outcome(s) achieved Date Met:  07/13/17  Goal: Absence of Falls  Outcome: Ongoing (interventions implemented as appropriate)    Problem: Cardiac: Heart Failure (Adult)  Goal: Signs and Symptoms of Listed Potential Problems Will be Absent or Manageable (Cardiac: Heart Failure)  Outcome: Outcome(s) achieved Date Met:  07/13/17    Problem: COPD, Chronic Bronchitis/Emphysema (Adult)  Goal: Signs and Symptoms of Listed Potential Problems Will be Absent or Manageable (COPD, Chronic Bronchitis/Emphysema)  Outcome: Outcome(s) achieved Date Met:  07/13/17

## 2017-07-14 LAB
ANION GAP SERPL CALCULATED.3IONS-SCNC: 12.2 MMOL/L
BACTERIA SPEC AEROBE CULT: NORMAL
BASOPHILS # BLD AUTO: 0.01 10*3/MM3 (ref 0–0.2)
BASOPHILS NFR BLD AUTO: 0.1 % (ref 0–1.5)
BUN BLD-MCNC: 16 MG/DL (ref 8–23)
BUN/CREAT SERPL: 17.2 (ref 7–25)
CALCIUM SPEC-SCNC: 8.2 MG/DL (ref 8.6–10.5)
CHLORIDE SERPL-SCNC: 106 MMOL/L (ref 98–107)
CK SERPL-CCNC: 200 U/L (ref 20–200)
CO2 SERPL-SCNC: 22.8 MMOL/L (ref 22–29)
CREAT BLD-MCNC: 0.93 MG/DL (ref 0.76–1.27)
DEPRECATED RDW RBC AUTO: 49 FL (ref 37–54)
EOSINOPHIL # BLD AUTO: 0.08 10*3/MM3 (ref 0–0.7)
EOSINOPHIL NFR BLD AUTO: 0.8 % (ref 0.3–6.2)
ERYTHROCYTE [DISTWIDTH] IN BLOOD BY AUTOMATED COUNT: 15 % (ref 11.5–14.5)
GFR SERPL CREATININE-BSD FRML MDRD: 77 ML/MIN/1.73
GLUCOSE BLD-MCNC: 96 MG/DL (ref 65–99)
HCT VFR BLD AUTO: 37.5 % (ref 40.4–52.2)
HGB BLD-MCNC: 11.9 G/DL (ref 13.7–17.6)
IMM GRANULOCYTES # BLD: 0.05 10*3/MM3 (ref 0–0.03)
IMM GRANULOCYTES NFR BLD: 0.5 % (ref 0–0.5)
LYMPHOCYTES # BLD AUTO: 0.87 10*3/MM3 (ref 0.9–4.8)
LYMPHOCYTES NFR BLD AUTO: 8.4 % (ref 19.6–45.3)
MCH RBC QN AUTO: 28.3 PG (ref 27–32.7)
MCHC RBC AUTO-ENTMCNC: 31.7 G/DL (ref 32.6–36.4)
MCV RBC AUTO: 89.1 FL (ref 79.8–96.2)
MONOCYTES # BLD AUTO: 0.8 10*3/MM3 (ref 0.2–1.2)
MONOCYTES NFR BLD AUTO: 7.7 % (ref 5–12)
NEUTROPHILS # BLD AUTO: 8.58 10*3/MM3 (ref 1.9–8.1)
NEUTROPHILS NFR BLD AUTO: 82.5 % (ref 42.7–76)
PLATELET # BLD AUTO: 147 10*3/MM3 (ref 140–500)
PMV BLD AUTO: 10.5 FL (ref 6–12)
POTASSIUM BLD-SCNC: 4.2 MMOL/L (ref 3.5–5.2)
RBC # BLD AUTO: 4.21 10*6/MM3 (ref 4.6–6)
SODIUM BLD-SCNC: 141 MMOL/L (ref 136–145)
WBC NRBC COR # BLD: 10.39 10*3/MM3 (ref 4.5–10.7)

## 2017-07-14 PROCEDURE — 25010000002 CEFTRIAXONE PER 250 MG: Performed by: INTERNAL MEDICINE

## 2017-07-14 PROCEDURE — 82550 ASSAY OF CK (CPK): CPT | Performed by: INTERNAL MEDICINE

## 2017-07-14 PROCEDURE — 85025 COMPLETE CBC W/AUTO DIFF WBC: CPT | Performed by: INTERNAL MEDICINE

## 2017-07-14 PROCEDURE — 25010000002 ENOXAPARIN PER 10 MG: Performed by: INTERNAL MEDICINE

## 2017-07-14 PROCEDURE — 80048 BASIC METABOLIC PNL TOTAL CA: CPT | Performed by: INTERNAL MEDICINE

## 2017-07-14 PROCEDURE — 97162 PT EVAL MOD COMPLEX 30 MIN: CPT

## 2017-07-14 PROCEDURE — 97110 THERAPEUTIC EXERCISES: CPT

## 2017-07-14 PROCEDURE — 94799 UNLISTED PULMONARY SVC/PX: CPT

## 2017-07-14 RX ADMIN — ASPIRIN 81 MG: 81 TABLET ORAL at 09:35

## 2017-07-14 RX ADMIN — FINASTERIDE 5 MG: 5 TABLET, FILM COATED ORAL at 09:35

## 2017-07-14 RX ADMIN — ENOXAPARIN SODIUM 40 MG: 40 INJECTION SUBCUTANEOUS at 09:38

## 2017-07-14 RX ADMIN — CEFTRIAXONE SODIUM 1 G: 1 INJECTION, SOLUTION INTRAVENOUS at 16:44

## 2017-07-14 RX ADMIN — METOPROLOL SUCCINATE 25 MG: 25 TABLET, FILM COATED, EXTENDED RELEASE ORAL at 09:34

## 2017-07-14 RX ADMIN — CETIRIZINE HYDROCHLORIDE 5 MG: 10 TABLET, FILM COATED ORAL at 09:35

## 2017-07-14 RX ADMIN — CALCIUM CARBONATE-VITAMIN D TAB 500 MG-200 UNIT 500 MG: 500-200 TAB at 09:35

## 2017-07-14 RX ADMIN — LEVOTHYROXINE SODIUM 25 MCG: 25 TABLET ORAL at 09:34

## 2017-07-14 RX ADMIN — PANTOPRAZOLE SODIUM 20 MG: 20 TABLET, DELAYED RELEASE ORAL at 09:35

## 2017-07-14 RX ADMIN — ATORVASTATIN CALCIUM 40 MG: 20 TABLET, FILM COATED ORAL at 09:34

## 2017-07-14 RX ADMIN — GUAIFENESIN 600 MG: 600 TABLET, EXTENDED RELEASE ORAL at 09:34

## 2017-07-14 RX ADMIN — FERROUS SULFATE TAB 325 MG (65 MG ELEMENTAL FE) 325 MG: 325 (65 FE) TAB at 09:35

## 2017-07-14 RX ADMIN — TIOTROPIUM BROMIDE 1 CAPSULE: 18 CAPSULE ORAL; RESPIRATORY (INHALATION) at 07:54

## 2017-07-14 NOTE — THERAPY EVALUATION
Acute Care - Physical Therapy Initial Evaluation  [facility]     Patient Name: Macario Willingham  : 1931  MRN: [id number]  Today's Date: 2017   Onset of Illness/Injury or Date of Surgery Date: 17            Admit Date: 2017     Visit Dx:    ICD-10-CM ICD-9-CM   1. Fall, initial encounter W19.XXXA E888.9   2. Elbow abrasion, right, initial encounter S50.311A 913.0   3. Leukocytosis, unspecified type D72.829 288.60   4. Acute UTI N39.0 599.0   5. Head injury, initial encounter S09.90XA 959.01   6. Syncope and collapse R55 780.2   7. Difficulty walking R26.2 719.7     Patient Active Problem List   Diagnosis   • Fall   • Sepsis due to urinary tract infection   • Pacemaker, artificial   • Ischemic cardiomyopathy   • Syncope   • Traumatic rhabdomyolysis     Past Medical History:   Diagnosis Date   • Arthritis    • Cancer     skin   • CHF (congestive heart failure)    • COPD (chronic obstructive pulmonary disease)    • Depression    • GERD (gastroesophageal reflux disease)    • Hypertension    • Myocardial infarction      Past Surgical History:   Procedure Laterality Date   • CARDIAC SURGERY      stents   • HERNIA REPAIR     • PACEMAKER IMPLANTATION            PT ASSESSMENT (last 72 hours)      PT Evaluation       17 1157 17 0856    Rehab Evaluation    Document Type evaluation  -     Subjective Information agree to therapy  -     Patient Effort, Rehab Treatment good  -CH     Symptoms Noted During/After Treatment none  -CH     General Information    Onset of Illness/Injury or Date of Surgery Date 17  -     General Observations supine in bed, no acute distress noted at rest, family present  -     Pertinent History Of Current Problem pt admitted with fall, syncope, and UTI with sepsis  -     Precautions/Limitations fall precautions  -     Prior Level of Function independent:;gait;transfer;bed mobility;ADL's   walks with a cane  -     Equipment Currently Used at Home  cane, straight   has walker but does not use it  - cane, straight   Pt states he has a walker, but does not use.  -SK    Plans/Goals Discussed With patient and family  -     Barriers to Rehab medically complex  -     Living Environment    Lives With  child(payton), adult  -SK    Living Arrangements  house  -SK    Home Accessibility  no concerns  -SK    Stair Railings at Home  outside, present on left side  -SK    Transportation Available  family or friend will provide  -SK    Clinical Impression    Patient/Family Goals Statement to return to [facility]  -     Criteria for Skilled Therapeutic Interventions Met treatment indicated  -     Impairments Found (describe specific impairments) gait, locomotion, and balance;muscle performance  -     Rehab Potential good, to achieve stated therapy goals  -     Pain Assessment    Pain Assessment No/denies pain  -     Cognitive Assessment/Intervention    Current Cognitive/Communication Assessment functional  -     Orientation Status oriented x 4  -     Follows Commands/Answers Questions 100% of the time  -     Personal Safety WNL/WFL  -     Personal Safety Interventions fall prevention program maintained;gait belt;nonskid shoes/slippers when out of bed  -     ROM (Range of Motion)    General ROM no range of motion deficits identified  -     MMT (Manual Muscle Testing)    General MMT Assessment other (see comments)   some generalized weakness noted with functional mobility  -     Bed Mobility, Assessment/Treatment    Bed Mob, Supine to Sit, [place] verbal cues required;nonverbal cues required (demo/gesture);contact guard assist  -     Bed Mob, Sit to Supine, [place] verbal cues required;nonverbal cues required (demo/gesture);contact guard assist  -     Transfer Assessment/Treatment    Transfers, Sit-Stand [place] verbal cues required;nonverbal cues required (demo/gesture);contact guard assist  -     Transfers, Stand-Sit [place]  verbal cues required;nonverbal cues required (demo/gesture);contact guard assist  -     Transfers, Sit-Stand-Sit, Assist Device straight cane  -     Gait Assessment/Treatment    Gait, [place] verbal cues required;nonverbal cues required (demo/gesture);minimum assist (75% patient effort);hand held assist  -     Gait, Assistive Device straight cane  -     Gait, Distance (Feet) 100  -     Gait, Gait Deviations ericka decreased;forward flexed posture;step length decreased;stride length decreased  -     Motor Skills/Interventions    Additional Documentation Balance Skills Training (Group)  -     Balance Skills Training    Standing-Level of Assistance Contact guard  -     Static Standing Balance Support assistive device  -     Gait Balance-Level of Assistance Minimum assistance  -     Gait Balance Support assistive device  -     Positioning and Restraints    Pre-Treatment Position in bed  -     Post Treatment Position bed  -     In Bed supine;call light within reach;encouraged to call for assist;exit alarm on;with family/caregiver;with nsg   aide present  -       07/13/17 1646 07/13/17 1621    General Information    Equipment Currently Used at Home  cane, straight  -    Living Environment    Lives With child(payton), adult  -     Living Arrangements house  -     Home Accessibility no concerns  -     Stair Railings at Home none  -     Type of Financial/Environmental Concern none  -     Transportation Available car  -       User Key  (r) = Recorded By, (t) = Taken By, (c) = Cosigned By    Initials Name Provider Type    LIZETH Bhatt PT Physical Therapist    REGINA Zavala, RN Registered Nurse    DUSTY Pulliam RN Case Manager          Physical Therapy Education     Title: PT OT SLP Therapies (Done)     Topic: Physical Therapy (Done)     Point: Mobility training (Done)    Learning Progress Summary    Learner Readiness Method Response Comment Documented by  Status   Patient Acceptance E,TB,D VU,NR   07/14/17 1524 Done               Point: Home exercise program (Done)    Learning Progress Summary    Learner Readiness Method Response Comment Documented by Status   Patient Acceptance E,TB,D VU,NR   07/14/17 1524 Done               Point: Body mechanics (Done)    Learning Progress Summary    Learner Readiness Method Response Comment Documented by Status   Patient Acceptance E,TB,D VU,NR   07/14/17 1524 Done               Point: Precautions (Done)    Learning Progress Summary    Learner Readiness Method Response Comment Documented by Status   Patient Acceptance E,TB,D VU,NR   07/14/17 1524 Done                      User Key     Initials Effective Dates Name Provider Type Discipline     12/01/15 -  Idalia Bhatt, PT Physical Therapist PT                PT Recommendation and Plan  Anticipated Discharge Disposition: home with home health, skilled nursing facility (pending progress)  Planned Therapy Interventions: balance training, bed mobility training, gait training, home exercise program, patient/family education, transfer training  PT Frequency: daily  Plan of Care Review  Plan Of Care Reviewed With: patient  Outcome Summary/Follow up Plan: Pt presents with impaired functional mobility and gait secondary to generalized weakness, impaired balance, and decreased activity tolerance s/p UTI with sepsis. Pt may benefit from skilled PT to address these defiits.          IP PT Goals       07/14/17 1524          Bed Mobility PT LTG    Bed Mobility PT LTG, Time to Achieve 1 wk  -CH      Bed Mobility PT LTG, Activity Type all bed mobility  -CH      Bed Mobility PT LTG, [place] Level supervision required  -CH      Transfer Training PT LTG    Transfer Training PT LTG, Time to Achieve 1 wk  -CH      Transfer Training PT LTG, Activity Type all transfers  -CH      Transfer Training PT LTG, [place] Level supervision required  -CH      Transfer Training PT LTG,  Assist Device cane, straight  -CH      Gait Training PT STG    Gait Training Goal PT STG, Time to Achieve 1 wk  -CH      Gait Training Goal PT STG, [place] Level supervision required  -CH      Gait Training Goal PT STG, Assist Device cane, straight  -CH      Gait Training Goal PT STG, Distance to Achieve 200  -CH        User Key  (r) = Recorded By, (t) = Taken By, (c) = Cosigned By    Initials Name Provider Type    LIZETH Bhatt PT Physical Therapist                Outcome Measures       07/14/17 1500          How much help from another person do you currently need...    Turning from your back to your side while in flat bed without using bedrails? 3  -CH      Moving from lying on back to sitting on the side of a flat bed without bedrails? 3  -CH      Moving to and from a bed to a chair (including a wheelchair)? 3  -CH      Standing up from a chair using your arms (e.g., wheelchair, bedside chair)? 3  -CH      Climbing 3-5 steps with a railing? 2  -CH      To walk in hospital room? 3  -CH      AM-PAC 6 Clicks Score 17  -      Functional Assessment    Outcome Measure Options AM-PAC 6 Clicks Basic Mobility (PT)  -        User Key  (r) = Recorded By, (t) = Taken By, (c) = Cosigned By    Initials Name Provider Type    LIZETH Bhatt PT Physical Therapist           Time Calculation:         PT Charges       07/14/17 1526 07/14/17 1159       Time Calculation    Start Time 1143  -      Stop Time 1157  -      Time Calculation (min) 14 min  -      PT Received On 07/14/17  -      PT - Next Appointment 07/15/17  - 07/15/17  -     PT Goal Re-Cert Due Date 07/21/17  -        User Key  (r) = Recorded By, (t) = Taken By, (c) = Cosigned By    Initials Name Provider Type    LIZETH Bhatt PT Physical Therapist          Therapy Charges for Today     Code Description Service Date Service Provider Modifiers Qty    98535237875 HC PT EVAL MOD COMPLEXITY 2 7/14/2017 Idalia Bhatt, PT GP 1     [id number]  PT THER PROC EA 15 MIN 7/14/2017 Idalia Bhatt, PT GP 1          PT G-Codes  Outcome Measure Options: AM-PAC 6 Clicks Basic Mobility (PT)      Idalia Bhatt, PT  7/14/2017

## 2017-07-14 NOTE — PLAN OF CARE
Problem: Patient Care Overview (Adult)  Goal: Plan of Care Review  Outcome: Ongoing (interventions implemented as appropriate)    07/14/17 0640   Coping/Psychosocial Response Interventions   Plan Of Care Reviewed With patient   Patient Care Overview   Progress improving   Outcome Evaluation   Outcome Summary/Follow up Plan patient receiving antibiotics for uti, no medications reactions. vitals remain stable, will continue to monitor, patient is urinating per urinal- urine is dark yellow and cloudy with odor- patient has some post-void residual, will continue to monitor.         Problem: Fall Risk (Adult)  Goal: Absence of Falls  Outcome: Ongoing (interventions implemented as appropriate)

## 2017-07-14 NOTE — DISCHARGE PLACEMENT REQUEST
"Macario Willingham (85 y.o. Male)     Date of Birth Social Security Number Address Home Phone MRN    08/19/1931  [address] [id number] [phone] [id number]    Gn[employer] Marital Status          Congregational        Admission Date Admission Type Admitting Provider Attending Provider Department, Room/Bed    7/13/17 Emergency Brian Chew MD Lykins, Matthew D, MD [address], 513/1    Discharge Date Discharge Disposition Discharge Destination                      Attending Provider: Brian Chew MD     Allergies:  Lisinopril, Penicillins    Isolation:  None   Infection:  None   Code Status:  FULL    Ht:  67\" (170.2 cm)   Wt:  130 lb (59 kg)    Admission Cmt:  None   Principal Problem:  Sepsis due to urinary tract infection [A41.9,N39.0]                 Active Insurance as of 7/13/2017     Primary Coverage     Payor Plan Insurance Group Employer/Plan Group    [facility] [id number]     Payor Plan Address Payor Plan Phone Number Effective From Effective To    [address]  [birth year]1980     [place] [address]       Subscriber Name Subscriber Birth Date Member ID       MACARIO WILLINGHAM 8/19/1931 [id number]           Secondary Coverage     Payor Plan Insurance Group Employer/Plan Group    MEDICARE MEDICARE A ONLY      Payor Plan Address Payor Plan Phone Number Effective From Effective To    [address] [phone] 8/1/1996     [place] [address]       Subscriber Name Subscriber Birth Date Member ID       MACARIO WILLINGHAM 8/19/1931 [id number]                 Emergency Contacts      (Rel.) Home Phone Work Phone Mobile Phone    Guzman Willingham (Daughter) -- -- [phone]              "

## 2017-07-14 NOTE — PROGRESS NOTES
Continued Stay Note  [facility]     Patient Name: Macario Willingham  MRN: [id number]  Today's Date: 7/14/2017    Admit Date: 7/13/2017          Discharge Plan       07/14/17 9326    Case Management/Social Work Plan    Plan Plan is to return home with his family upon DC.  [facility] [place] [facility].    Additional Comments Spoke with Sona/ [facility] who requests that [facility] fax DC summary and meds to Dr. Iyer upon DC (fax: [phone]) so that HH can be arranged.  Sona notified that Ludy/ [facility] is aware.              Discharge Codes     None            Alyssa Pulliam RN

## 2017-07-14 NOTE — PLAN OF CARE
Problem: Patient Care Overview (Adult)  Goal: Plan of Care Review  Outcome: Ongoing (interventions implemented as appropriate)    07/14/17 1524   Coping/Psychosocial Response Interventions   Plan Of Care Reviewed With patient   Outcome Evaluation   Outcome Summary/Follow up Plan Pt presents with impaired functional mobility and gait secondary to generalized weakness, impaired balance, and decreased activity tolerance s/p UTI with sepsis. Pt may benefit from skilled PT to address these defiits.         Problem: Inpatient Physical Therapy  Goal: Bed Mobility Goal LTG- PT  Outcome: Ongoing (interventions implemented as appropriate)    07/14/17 1524   Bed Mobility PT LTG   Bed Mobility PT LTG, Time to Achieve 1 wk   Bed Mobility PT LTG, Activity Type all bed mobility   Bed Mobility PT LTG, [place] Level supervision required       Goal: Transfer Training Goal 1 LTG- PT  Outcome: Ongoing (interventions implemented as appropriate)    07/14/17 1524   Transfer Training PT LTG   Transfer Training PT LTG, Time to Achieve 1 wk   Transfer Training PT LTG, Activity Type all transfers   Transfer Training PT LTG, [place] Level supervision required   Transfer Training PT LTG, Assist Device cane, straight       Goal: Gait Training Goal STG- PT  Outcome: Ongoing (interventions implemented as appropriate)    07/14/17 1524   Gait Training PT STG   Gait Training Goal PT STG, Time to Achieve 1 wk   Gait Training Goal PT STG, [place] Level supervision required   Gait Training Goal PT STG, Assist Device cane, straight   Gait Training Goal PT STG, Distance to Achieve 200

## 2017-07-14 NOTE — PROGRESS NOTES
Name: Macario Willingham ADMIT: 2017   : 1931  PCP: Idalia Iyer MD    MRN: [id number] LOS: 1 days   AGE/SEX: 85 y.o. male  ROOM: [facility]   Subjective   Subjective  CC/Reason for follow-up: syncope, sepsis, UTI  No acute events.  Patient has no new complaints.  Generally feeling better.  No syncope/presyncope.  Dysuria improving.  Ambulating with physical therapy.  Pacer interrogated.  Objective   Vital Signs  Temp:  [98.4 °F (36.9 °C)-98.8 °F (37.1 °C)] 98.4 °F (36.9 °C)  Heart Rate:  [77-86] 84  Resp:  [16-18] 16  BP: ()/(45-84) 97/56  SpO2:  [93 %-97 %] 97 %  on   ;   O2 Device: room air  Body mass index is 20.36 kg/(m^2).    Physical Exam  General: Alert. No distress.   Head: Normocephalic and atraumatic.   Mouth/Throat: Oropharynx is clear and moist.   Eyes: EOMI, Conjunctivae normal  Neck: Neck supple. No JVD present.   Cardiovascular: Normal rate, regular rhythm; pacer in place  Pulmonary/Chest: Effort normal and breath sounds normal.  Abdominal: Soft. Non-tender. Bowel sounds are normoactive  Musculoskeletal: no edema or deformity.   Neurological: Oriented x3, no gross deficits  Skin: Skin is warm and dry.   Psychiatric: Mood and affect appropriate  Results Review:       I reviewed the patient's new clinical results.    Results from last 7 days  Lab Units 17  0744 17  1124   WBC 10*3/mm3 10.39 23.96*   HEMOGLOBIN g/dL 11.9* 14.1   PLATELETS 10*3/mm3 147 174     Results from last 7 days  Lab Units 17  0744 17  1124   SODIUM mmol/L 141 141   POTASSIUM mmol/L 4.2 4.2   CHLORIDE mmol/L 106 102   CO2 mmol/L 22.8 27.3   BUN mg/dL 16 19   CREATININE mg/dL 0.93 1.07   GLUCOSE mg/dL 96 121*   Estimated Creatinine Clearance: 48.5 mL/min (by C-G formula based on Cr of 0.93).  Results from last 7 days  Lab Units 17  0744 17  1124   CALCIUM mg/dL 8.2* 9.7   ALBUMIN g/dL  --  3.50   MAGNESIUM mg/dL  --  2.2         aspirin 81 mg Oral Daily   atorvastatin  40 mg Oral Daily   calcium-vitamin D 500 mg Oral Daily   ceftriaxone 1 g Intravenous Q24H   cetirizine 5 mg Oral Daily   enoxaparin 40 mg Subcutaneous Daily   ferrous sulfate 325 mg Oral Daily   finasteride 5 mg Oral Daily   guaiFENesin 600 mg Oral Daily   levothyroxine 25 mcg Oral Daily   metoprolol succinate XL 25 mg Oral Daily   pantoprazole 20 mg Oral Daily   tiotropium 1 capsule Inhalation Daily - RT      Diet Regular; Cardiac      Assessment/Plan   Assessment:     Active Hospital Problems (** Indicates Principal Problem)    Diagnosis Date Noted   • **Sepsis due to urinary tract infection [A41.9, N39.0] 07/13/2017   • Fall [W19.XXXA] 07/13/2017   • Pacemaker, artificial [Z95.0] 07/13/2017   • Ischemic cardiomyopathy [I25.5] 07/13/2017   • Syncope [R55] 07/13/2017   • Traumatic rhabdomyolysis [T79.6XXA] 07/13/2017      Resolved Hospital Problems    Diagnosis Date Noted Date Resolved   No resolved problems to display.       Plan:   Sepsis due to urinary tract infection  -WBC 23,000, respiratory rate 22 on admission, LA nml  -blood cx's negative, urine cx growing mixed ashish  -continue with ceftriaxone, will plan to complete course with cefdinir     Syncope  -likely this is orthostatic from dehydration related to sepsis  -pacer interrogated, no events and working properly  -PT following     Rhabdomyolysis  -traumatic  -improved with IVF, CK nml and UOP good    Urine Retention  -chronic issue, has been having borderline PVRs  -continue on current regimen     DVT Prophylaxis  -lovenox      Disposition  Home with HH tomorrow if he continues to improve.      Brian Chew MD  [facility]  07/14/17  4:34 PM

## 2017-07-14 NOTE — PLAN OF CARE
Problem: Patient Care Overview (Adult)  Goal: Plan of Care Review  Outcome: Ongoing (interventions implemented as appropriate)    07/14/17 5713   Coping/Psychosocial Response Interventions   Plan Of Care Reviewed With patient   Patient Care Overview   Progress improving   Outcome Evaluation   Outcome Summary/Follow up Plan Pt continues to improve. Pt has been up working with PT today. UTI symptoms are resolving. Will continue to monitor vitals and labs. Possbile D/C tomorrow.       Goal: Adult Individualization and Mutuality  Outcome: Ongoing (interventions implemented as appropriate)  Goal: Discharge Needs Assessment  Outcome: Ongoing (interventions implemented as appropriate)    Problem: Fall Risk (Adult)  Goal: Absence of Falls  Outcome: Ongoing (interventions implemented as appropriate)    Problem: Infection, Risk/Actual (Adult)  Goal: Identify Related Risk Factors and Signs and Symptoms  Outcome: Outcome(s) achieved Date Met:  07/14/17  Goal: Infection Prevention/Resolution  Outcome: Ongoing (interventions implemented as appropriate)

## 2017-07-14 NOTE — PROGRESS NOTES
Continued Stay Note  [facility]     Patient Name: Macario Willingham  MRN: [id number]  Today's Date: 7/14/2017    Admit Date: 7/13/2017          Discharge Plan       07/14/17 1401    Case Management/Social Work Plan    Plan Plan is to return home with his family upon DC with possible [place] [facility] (if [facility] by [facility]).    Additional Comments Spoke with pt's dtr, Guzman who states that pt had [facility] in 2015.  Call to Ludy/ [facility] who confirms this.  Ludy say that the order will need to come from [facility].  She suggests that [facility] call [facility] to request [facility].  And she will also call [facility].  VM message left with Sona Head/ [facility] ([phone]) with request and CCP contact info.           Alyssa Pulliam RN

## 2017-07-15 VITALS
DIASTOLIC BLOOD PRESSURE: 73 MMHG | SYSTOLIC BLOOD PRESSURE: 123 MMHG | HEIGHT: 67 IN | WEIGHT: 133 LBS | RESPIRATION RATE: 20 BRPM | HEART RATE: 86 BPM | TEMPERATURE: 98 F | OXYGEN SATURATION: 95 % | BODY MASS INDEX: 20.88 KG/M2

## 2017-07-15 PROBLEM — T79.6XXA TRAUMATIC RHABDOMYOLYSIS (HCC): Status: RESOLVED | Noted: 2017-07-13 | Resolved: 2017-07-15

## 2017-07-15 PROBLEM — R33.9 URINE RETENTION: Status: ACTIVE | Noted: 2017-07-15

## 2017-07-15 PROBLEM — R55 SYNCOPE: Status: RESOLVED | Noted: 2017-07-13 | Resolved: 2017-07-15

## 2017-07-15 PROBLEM — A41.9 SEPSIS DUE TO URINARY TRACT INFECTION (HCC): Status: RESOLVED | Noted: 2017-07-13 | Resolved: 2017-07-15

## 2017-07-15 PROBLEM — N39.0 SEPSIS DUE TO URINARY TRACT INFECTION (HCC): Status: RESOLVED | Noted: 2017-07-13 | Resolved: 2017-07-15

## 2017-07-15 PROBLEM — N35.919 URETHRAL STRICTURE: Status: ACTIVE | Noted: 2017-07-15

## 2017-07-15 LAB
ANION GAP SERPL CALCULATED.3IONS-SCNC: 10.4 MMOL/L
BASOPHILS # BLD AUTO: 0.01 10*3/MM3 (ref 0–0.2)
BASOPHILS NFR BLD AUTO: 0.1 % (ref 0–1.5)
BUN BLD-MCNC: 13 MG/DL (ref 8–23)
BUN/CREAT SERPL: 17.1 (ref 7–25)
CALCIUM SPEC-SCNC: 8.7 MG/DL (ref 8.6–10.5)
CHLORIDE SERPL-SCNC: 104 MMOL/L (ref 98–107)
CO2 SERPL-SCNC: 22.6 MMOL/L (ref 22–29)
CREAT BLD-MCNC: 0.76 MG/DL (ref 0.76–1.27)
DEPRECATED RDW RBC AUTO: 48.5 FL (ref 37–54)
EOSINOPHIL # BLD AUTO: 0.32 10*3/MM3 (ref 0–0.7)
EOSINOPHIL NFR BLD AUTO: 4.4 % (ref 0.3–6.2)
ERYTHROCYTE [DISTWIDTH] IN BLOOD BY AUTOMATED COUNT: 14.7 % (ref 11.5–14.5)
GFR SERPL CREATININE-BSD FRML MDRD: 97 ML/MIN/1.73
GLUCOSE BLD-MCNC: 93 MG/DL (ref 65–99)
HCT VFR BLD AUTO: 39 % (ref 40.4–52.2)
HGB BLD-MCNC: 12.5 G/DL (ref 13.7–17.6)
IMM GRANULOCYTES # BLD: 0.02 10*3/MM3 (ref 0–0.03)
IMM GRANULOCYTES NFR BLD: 0.3 % (ref 0–0.5)
LYMPHOCYTES # BLD AUTO: 1.17 10*3/MM3 (ref 0.9–4.8)
LYMPHOCYTES NFR BLD AUTO: 15.9 % (ref 19.6–45.3)
MCH RBC QN AUTO: 29.1 PG (ref 27–32.7)
MCHC RBC AUTO-ENTMCNC: 32.1 G/DL (ref 32.6–36.4)
MCV RBC AUTO: 90.9 FL (ref 79.8–96.2)
MONOCYTES # BLD AUTO: 0.8 10*3/MM3 (ref 0.2–1.2)
MONOCYTES NFR BLD AUTO: 10.9 % (ref 5–12)
NEUTROPHILS # BLD AUTO: 5.02 10*3/MM3 (ref 1.9–8.1)
NEUTROPHILS NFR BLD AUTO: 68.4 % (ref 42.7–76)
PLATELET # BLD AUTO: 142 10*3/MM3 (ref 140–500)
PMV BLD AUTO: 10 FL (ref 6–12)
POTASSIUM BLD-SCNC: 3.9 MMOL/L (ref 3.5–5.2)
RBC # BLD AUTO: 4.29 10*6/MM3 (ref 4.6–6)
SODIUM BLD-SCNC: 137 MMOL/L (ref 136–145)
WBC NRBC COR # BLD: 7.34 10*3/MM3 (ref 4.5–10.7)

## 2017-07-15 PROCEDURE — 85025 COMPLETE CBC W/AUTO DIFF WBC: CPT | Performed by: INTERNAL MEDICINE

## 2017-07-15 PROCEDURE — 94799 UNLISTED PULMONARY SVC/PX: CPT

## 2017-07-15 PROCEDURE — 80048 BASIC METABOLIC PNL TOTAL CA: CPT | Performed by: INTERNAL MEDICINE

## 2017-07-15 PROCEDURE — 25010000002 ENOXAPARIN PER 10 MG: Performed by: INTERNAL MEDICINE

## 2017-07-15 RX ORDER — CEFDINIR 300 MG/1
300 CAPSULE ORAL 2 TIMES DAILY
Qty: 16 CAPSULE | Refills: 0 | Status: SHIPPED | OUTPATIENT
Start: 2017-07-15 | End: 2017-07-23

## 2017-07-15 RX ADMIN — TIOTROPIUM BROMIDE 1 CAPSULE: 18 CAPSULE ORAL; RESPIRATORY (INHALATION) at 09:26

## 2017-07-15 RX ADMIN — PANTOPRAZOLE SODIUM 20 MG: 20 TABLET, DELAYED RELEASE ORAL at 09:26

## 2017-07-15 RX ADMIN — FERROUS SULFATE TAB 325 MG (65 MG ELEMENTAL FE) 325 MG: 325 (65 FE) TAB at 09:01

## 2017-07-15 RX ADMIN — CALCIUM CARBONATE-VITAMIN D TAB 500 MG-200 UNIT 500 MG: 500-200 TAB at 09:01

## 2017-07-15 RX ADMIN — LEVOTHYROXINE SODIUM 25 MCG: 25 TABLET ORAL at 09:01

## 2017-07-15 RX ADMIN — ENOXAPARIN SODIUM 40 MG: 40 INJECTION SUBCUTANEOUS at 09:02

## 2017-07-15 RX ADMIN — FINASTERIDE 5 MG: 5 TABLET, FILM COATED ORAL at 09:01

## 2017-07-15 RX ADMIN — ATORVASTATIN CALCIUM 40 MG: 20 TABLET, FILM COATED ORAL at 09:01

## 2017-07-15 RX ADMIN — CETIRIZINE HYDROCHLORIDE 5 MG: 10 TABLET, FILM COATED ORAL at 09:01

## 2017-07-15 RX ADMIN — METOPROLOL SUCCINATE 25 MG: 25 TABLET, FILM COATED, EXTENDED RELEASE ORAL at 09:01

## 2017-07-15 RX ADMIN — ASPIRIN 81 MG: 81 TABLET ORAL at 09:01

## 2017-07-15 RX ADMIN — GUAIFENESIN 600 MG: 600 TABLET, EXTENDED RELEASE ORAL at 09:01

## 2017-07-15 NOTE — SIGNIFICANT NOTE
07/15/17 1551   Rehab Treatment   Discipline physical therapy assistant   Treatment Not Performed patient unavailable for treatment  (Per Matthew the nurse pt is to be seen tomorrow.  Pt having a procedure done)   Recommendation   PT - Next Appointment 07/16/17

## 2017-07-15 NOTE — DISCHARGE INSTRUCTIONS
Remove whittaekr catheter 6 hours prior to urology appointment.  To remove whittaker catheter, take 10 ml syringe and remove water from balloon in whittaker catheter.  Once all water is removed ( approximately 10 ml's), grasp whittaker catheter and gently pull away from penis.  If resistance is felt, ensure all of the water is removed from the whittaker catheter balloon ( orange port) and remove whittaker catheter.  Drink plenty of fluids prior to your appointment.

## 2017-07-15 NOTE — CONSULTS
Dictated  Retention  Uti and inability to cath.  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  Sp turp and long ho of stx dx.will fu for a vt 2 weeks

## 2017-07-15 NOTE — DISCHARGE SUMMARY
Date of Admission: 7/13/2017  Date of Discharge:  7/15/2017  Primary Care Physician: Idalia Iyer MD     Discharge Diagnosis:  Active Hospital Problems (** Indicates Principal Problem)    Diagnosis Date Noted   • Urine retention [R33.9] 07/15/2017   • Urethral stricture [N35.9] 07/15/2017   • Fall [W19.XXXA] 07/13/2017   • Pacemaker, artificial [Z95.0] 07/13/2017   • Ischemic cardiomyopathy [I25.5] 07/13/2017      Resolved Hospital Problems    Diagnosis Date Noted Date Resolved   • **Sepsis due to urinary tract infection [A41.9, N39.0] 07/13/2017 07/15/2017   • Syncope [R55] 07/13/2017 07/15/2017   • Traumatic rhabdomyolysis [T79.6XXA] 07/13/2017 07/15/2017       Presenting Problem/History of Present Illness  Syncope and collapse [R55]  Acute UTI [N39.0]  Fall, initial encounter [W19.XXXA]  Head injury, initial encounter [S09.90XA]  Elbow abrasion, right, initial encounter [S50.311A]  Leukocytosis, unspecified type [D72.829]     Hospital Course  The patient is a 85 y.o. male who presented after a syncopal episode when getting up to go to the bathroom.  Please see admission H&P from 7/13/17 for further details.  He was found to be septic with a UTI with a WBC count of 23,000 and RR of 22.  He was started on ceftriaxone and IV fluids.  He responded quite well to this.  His WBC was normal on the day of discharge.  His urine culture was unfortunately contaminated, but given his good response to ceftriaxone he was discharged on cefdinir for the remainder of his course.      He does have a [employer] pacemaker and this was interrogated on admission to work up his syncope.  His pacemaker was found to be functioning properly and there were no arrhythmias or abnormal events recorded.  He had no heart murmur and no known valvular disorder.  His syncopal episode was thought to have been due to his UTI and possible orthostasis from this as he was septic.  The patient unfortunately had some urine retention  "with consistent PVRs in the 600s. A whittaker cath was attempted but unable to be passed so Dr. Chamorro with urology was consulted.  He preformed a uretheral dilation and inserted a whittaker catheter on 7/15/17.  He is to follow up with him in a couple weeks for a voiding trial.  He has known BPH and takes finasteride.      Exam Today:  Blood pressure 123/73, pulse 86, temperature 98 °F (36.7 °C), temperature source Oral, resp. rate 20, height 67\" (170.2 cm), weight 133 lb (60.3 kg), SpO2 95 %.  General: Alert. No distress.   Head: Normocephalic and atraumatic.   Mouth/Throat: Oropharynx is clear and moist.   Eyes: EOMI, Conjunctivae normal  Neck: Neck supple. No JVD present.   Cardiovascular: Normal rate, regular rhythm; pacer in place  Pulmonary/Chest: Effort normal and breath sounds normal.  Abdominal: Soft. Non-tender. Bowel sounds are normoactive  Musculoskeletal: no edema or deformity.   Neurological: Oriented x3, no gross deficits  Skin: Skin is warm and dry.   Psychiatric: Mood and affect appropriate    Procedures Performed:  CT HEAD WITHOUT CONTRAST AND CT FACIAL BONES WITHOUT CONTRAST      HISTORY: Fall, hit head, headache, facial pain.      FINDINGS:   CT HEAD WITHOUT CONTRAST: The brain and ventricles are symmetrical.  There is moderate atrophy. Vascular calcification is noted. There is no  evidence of intracranial hemorrhage, hydrocephalus or of abnormal  extra-axial fluid. No focal area of decreased attenuation to suggest  acute infarction is identified.      CT FACIAL BONES WITHOUT CONTRAST: A CT examination of the facial bones  was performed. Multiplanar reconstructions were also generated.      FINDINGS: Bilateral lens implants are noted. There is no evidence of  fracture. The visualized portions of the paranasal sinuses are clear.      The above information was called to and discussed with Dr. Crawford.      This report was finalized on 7/14/2017 7:55 PM by Dr. Casey Benavidez MD.      CT HEAD WITHOUT CONTRAST " AND CT FACIAL BONES WITHOUT CONTRAST      HISTORY: Fall, hit head, headache, facial pain.      FINDINGS:   CT HEAD WITHOUT CONTRAST: The brain and ventricles are symmetrical.  There is moderate atrophy. Vascular calcification is noted. There is no  evidence of intracranial hemorrhage, hydrocephalus or of abnormal  extra-axial fluid. No focal area of decreased attenuation to suggest  acute infarction is identified.      CT FACIAL BONES WITHOUT CONTRAST: A CT examination of the facial bones  was performed. Multiplanar reconstructions were also generated.      FINDINGS: Bilateral lens implants are noted. There is no evidence of  fracture. The visualized portions of the paranasal sinuses are clear.      The above information was called to and discussed with Dr. Crawford.      This report was finalized on 7/14/2017 7:55 PM by Dr. Casey Benavidez MD.    TWO-VIEW CHEST      HISTORY: Found on floor this morning. Shortness of breath.      FINDINGS: The lungs are somewhat hyperinflated and clear. There is mild  cardiomegaly with a triple lead pacemaker in place. There is also a  small hiatus hernia present. There is no acute disease or change from  11/06/2012.      This report was finalized on 7/13/2017 1:56 PM by Dr. Jose Manuel Plaza MD.      Consults:   Consults     Date and Time Order Name Status Description    7/15/2017 1456 Inpatient Consult to Urology Completed     7/13/2017 1326 [facility] (on-call MD unless specified) Completed            Discharge Disposition  Home or Self Care    Discharge Medications:   Macario Willingham   Home Medication Instructions JENNIFER:[id number]    Printed on:07/15/17 1948   Medication Information                      albuterol (PROVENTIL HFA;VENTOLIN HFA) 108 (90 BASE) MCG/ACT inhaler  Inhale 2 puffs Every 6 (Six) Hours As Needed for Wheezing.             aspirin 81 MG EC tablet  Take 81 mg by mouth Daily.             atorvastatin (LIPITOR) 40 MG tablet  Take 40 mg by mouth Daily.             calcium  citrate-vitamin d (CITRACAL) 200-250 MG-UNIT tablet tablet  Take 1 tablet by mouth Daily.             cefdinir (OMNICEF) 300 MG capsule  Take 1 capsule by mouth 2 (Two) Times a Day for 8 days.             ferrous sulfate 325 (65 FE) MG tablet  Take 325 mg by mouth Daily.             finasteride (PROSCAR) 5 MG tablet  Take 5 mg by mouth Daily.             furosemide (LASIX) 20 MG tablet  Take 20 mg by mouth Daily.             guaiFENesin (MUCINEX) 600 MG 12 hr tablet  Take 600 mg by mouth Daily.             levothyroxine (SYNTHROID, LEVOTHROID) 25 MCG tablet  Take 25 mcg by mouth Daily.             loratadine (CLARITIN) 10 MG tablet  Take 10 mg by mouth Daily.             metoprolol succinate XL (TOPROL-XL) 25 MG 24 hr tablet  Take 25 mg by mouth Daily.             Multiple Vitamins-Minerals (MULTIVITAL) tablet  Take 1 tablet by mouth Daily.             nitroglycerin (NITROSTAT) 0.4 MG SL tablet  Place 0.4 mg under the tongue Every 5 (Five) Minutes As Needed for Chest Pain. Take no more than 3 doses in 15 minutes.             pantoprazole (PROTONIX) 20 MG EC tablet  Take 20 mg by mouth Daily.             tiotropium (SPIRIVA) 18 MCG per inhalation capsule  Place 1 capsule into inhaler and inhale Daily.                 Discharge Diet:   Diet Instructions     Diet: Cardiac; Thin Liquids, No Restrictions       Discharge Diet:  Cardiac   Fluid Consistency:  Thin Liquids, No Restrictions                 Activity at Discharge:   Activity Instructions     Activity as Tolerated                     Follow-up Appointments:  No future appointments.  Additional Instructions for the Follow-ups that You Need to Schedule     Discharge Follow-Up With Specified Provider    As directed    To:  Dr. Chamorro   Follow Up:  2 Weeks       Discharge Follow-up with PCP    As directed    Follow Up Details:  1 week       Referral to Home Health    As directed    Patient follows with PCP at [place], [place]   Face to Face Visit Date:   7/15/2017   Follow-up Provider for Plan of Care?:  I treated the patient in an acute care facility and will not continue treatment after discharge.   Follow-up Provider:  NO KNOWN PROVIDER   Reason/Clinical Findings:  general weakness/syncope   Describe mobility limitations that make leaving home difficult:  general weakness   Nursing/Therapeutic Services Requested:  Physical Therapy   PT orders:   Gait Training  Strengthening      Weight Bearing Status:  As Tolerated   Frequency:  1 Week 1           Additional information on Labs and Follow-ups:      [facility] to [facility] you for follow up appointment.                    Test Results Pending at Discharge   Order Current Status    Blood Culture Preliminary result    Blood Culture Preliminary result           Brian Chew MD  07/15/17  7:48 PM    Time Spent on Discharge Activities: Greater than 30 minutes.

## 2017-07-15 NOTE — PLAN OF CARE
Problem: Patient Care Overview (Adult)  Goal: Plan of Care Review  Outcome: Ongoing (interventions implemented as appropriate)    07/15/17 0515   Coping/Psychosocial Response Interventions   Plan Of Care Reviewed With patient   Patient Care Overview   Progress improving   Outcome Evaluation   Outcome Summary/Follow up Plan patient wbc count has decreased and continues to tolerate antibiotic. potential d/c today . vitals remain stable, will continue to monitor.         Problem: Fall Risk (Adult)  Goal: Absence of Falls  Outcome: Ongoing (interventions implemented as appropriate)    Problem: Infection, Risk/Actual (Adult)  Goal: Infection Prevention/Resolution  Outcome: Ongoing (interventions implemented as appropriate)

## 2017-07-17 NOTE — PROGRESS NOTES
Continued Stay Note  [facility]     Patient Name: Macario Willingham  MRN: [id number]  Today's Date: 7/17/2017    Admit Date: 7/13/2017          Discharge Plan       07/17/17 0806    Case Management/Social Work Plan    Additional Comments Pt DC'd 7/15.  Call to [facility] who [facility].  Per Sona's request, [facility] faxed DC summary (with med list) and [facility] Iyer (fax: [phone])              Discharge Codes       07/17/17 0806    Discharge Codes    Discharge Codes 06  Discharged/transferred to home under care of organized home health service organization in anticipation of skilled care   [facility]              Alyssa Pulliam RN

## 2017-07-18 LAB
BACTERIA SPEC AEROBE CULT: NORMAL
BACTERIA SPEC AEROBE CULT: NORMAL

## 2020-02-22 ENCOUNTER — HOSPITAL ENCOUNTER (EMERGENCY)
Facility: HOSPITAL | Age: 85
Discharge: HOME | End: 2020-02-22
Attending: EMERGENCY MEDICINE | Admitting: EMERGENCY MEDICINE

## 2020-02-22 VITALS
RESPIRATION RATE: 16 BRPM | HEART RATE: 86 BPM | DIASTOLIC BLOOD PRESSURE: 65 MMHG | TEMPERATURE: 97.8 F | HEIGHT: 67 IN | BODY MASS INDEX: 21.19 KG/M2 | SYSTOLIC BLOOD PRESSURE: 105 MMHG | WEIGHT: 135 LBS | OXYGEN SATURATION: 99 %

## 2020-02-22 DIAGNOSIS — N35.911 STRICTURE OF URETHRAL MEATUS IN MALE, UNSPECIFIED STRICTURE TYPE: Primary | ICD-10-CM

## 2020-02-22 LAB
ANION GAP SERPL CALCULATED.3IONS-SCNC: 10.8 MMOL/L (ref 5–15)
BACTERIA UR QL AUTO: ABNORMAL /HPF
BASOPHILS # BLD AUTO: 0.03 10*3/MM3 (ref 0–0.2)
BASOPHILS NFR BLD AUTO: 0.3 % (ref 0–1.5)
BILIRUB UR QL STRIP: NEGATIVE
BUN BLD-MCNC: 16 MG/DL (ref 8–23)
BUN/CREAT SERPL: 17.8 (ref 7–25)
CALCIUM SPEC-SCNC: 9.3 MG/DL (ref 8.6–10.5)
CHLORIDE SERPL-SCNC: 101 MMOL/L (ref 98–107)
CLARITY UR: CLEAR
CO2 SERPL-SCNC: 28.2 MMOL/L (ref 22–29)
COLOR UR: YELLOW
CREAT BLD-MCNC: 0.9 MG/DL (ref 0.76–1.27)
DEPRECATED RDW RBC AUTO: 46 FL (ref 37–54)
EOSINOPHIL # BLD AUTO: 0.07 10*3/MM3 (ref 0–0.4)
EOSINOPHIL NFR BLD AUTO: 0.7 % (ref 0.3–6.2)
ERYTHROCYTE [DISTWIDTH] IN BLOOD BY AUTOMATED COUNT: 15 % (ref 12.3–15.4)
GFR SERPL CREATININE-BSD FRML MDRD: 80 ML/MIN/1.73
GLUCOSE BLD-MCNC: 131 MG/DL (ref 65–99)
GLUCOSE UR STRIP-MCNC: NEGATIVE MG/DL
HCT VFR BLD AUTO: 43.5 % (ref 37.5–51)
HGB BLD-MCNC: 14.1 G/DL (ref 13–17.7)
HGB UR QL STRIP.AUTO: ABNORMAL
HOLD SPECIMEN: NORMAL
HYALINE CASTS UR QL AUTO: ABNORMAL /LPF
IMM GRANULOCYTES # BLD AUTO: 0.04 10*3/MM3 (ref 0–0.05)
IMM GRANULOCYTES NFR BLD AUTO: 0.4 % (ref 0–0.5)
KETONES UR QL STRIP: NEGATIVE
LEUKOCYTE ESTERASE UR QL STRIP.AUTO: ABNORMAL
LYMPHOCYTES # BLD AUTO: 0.65 10*3/MM3 (ref 0.7–3.1)
LYMPHOCYTES NFR BLD AUTO: 6.7 % (ref 19.6–45.3)
MCH RBC QN AUTO: 27.3 PG (ref 26.6–33)
MCHC RBC AUTO-ENTMCNC: 32.4 G/DL (ref 31.5–35.7)
MCV RBC AUTO: 84.1 FL (ref 79–97)
MONOCYTES # BLD AUTO: 0.11 10*3/MM3 (ref 0.1–0.9)
MONOCYTES NFR BLD AUTO: 1.1 % (ref 5–12)
NEUTROPHILS # BLD AUTO: 8.77 10*3/MM3 (ref 1.7–7)
NEUTROPHILS NFR BLD AUTO: 90.8 % (ref 42.7–76)
NITRITE UR QL STRIP: NEGATIVE
NRBC BLD AUTO-RTO: 0 /100 WBC (ref 0–0.2)
PH UR STRIP.AUTO: 7 [PH] (ref 5–8)
PLATELET # BLD AUTO: 178 10*3/MM3 (ref 140–450)
PMV BLD AUTO: 9.8 FL (ref 6–12)
POTASSIUM BLD-SCNC: 4.1 MMOL/L (ref 3.5–5.2)
PROT UR QL STRIP: NEGATIVE
RBC # BLD AUTO: 5.17 10*6/MM3 (ref 4.14–5.8)
RBC # UR: ABNORMAL /HPF
REF LAB TEST METHOD: ABNORMAL
SODIUM BLD-SCNC: 140 MMOL/L (ref 136–145)
SP GR UR STRIP: 1.01 (ref 1–1.03)
SQUAMOUS #/AREA URNS HPF: ABNORMAL /HPF
TRANS CELLS #/AREA URNS HPF: ABNORMAL /HPF
UROBILINOGEN UR QL STRIP: ABNORMAL
WBC NRBC COR # BLD: 9.67 10*3/MM3 (ref 3.4–10.8)
WBC UR QL AUTO: ABNORMAL /HPF
WHOLE BLOOD HOLD SPECIMEN: NORMAL

## 2020-02-22 PROCEDURE — 80048 BASIC METABOLIC PNL TOTAL CA: CPT | Performed by: EMERGENCY MEDICINE

## 2020-02-22 PROCEDURE — 96374 THER/PROPH/DIAG INJ IV PUSH: CPT

## 2020-02-22 PROCEDURE — 51702 INSERT TEMP BLADDER CATH: CPT

## 2020-02-22 PROCEDURE — 81001 URINALYSIS AUTO W/SCOPE: CPT | Performed by: EMERGENCY MEDICINE

## 2020-02-22 PROCEDURE — 25010000002 HYDROMORPHONE PER 4 MG: Performed by: UROLOGY

## 2020-02-22 PROCEDURE — 51798 US URINE CAPACITY MEASURE: CPT

## 2020-02-22 PROCEDURE — 99284 EMERGENCY DEPT VISIT MOD MDM: CPT

## 2020-02-22 PROCEDURE — 85025 COMPLETE CBC W/AUTO DIFF WBC: CPT | Performed by: EMERGENCY MEDICINE

## 2020-02-22 RX ORDER — SODIUM CHLORIDE 0.9 % (FLUSH) 0.9 %
10 SYRINGE (ML) INJECTION AS NEEDED
Status: DISCONTINUED | OUTPATIENT
Start: 2020-02-22 | End: 2020-02-22 | Stop reason: HOSPADM

## 2020-02-22 RX ORDER — HYDROMORPHONE HYDROCHLORIDE 1 MG/ML
0.5 INJECTION, SOLUTION INTRAMUSCULAR; INTRAVENOUS; SUBCUTANEOUS
Status: DISCONTINUED | OUTPATIENT
Start: 2020-02-22 | End: 2020-02-22 | Stop reason: HOSPADM

## 2020-02-22 RX ADMIN — SODIUM CHLORIDE, PRESERVATIVE FREE 10 ML: 5 INJECTION INTRAVENOUS at 14:24

## 2020-02-22 RX ADMIN — HYDROMORPHONE HYDROCHLORIDE 0.5 MG: 1 INJECTION, SOLUTION INTRAMUSCULAR; INTRAVENOUS; SUBCUTANEOUS at 16:32

## 2021-02-03 ENCOUNTER — APPOINTMENT (OUTPATIENT)
Dept: GENERAL RADIOLOGY | Facility: HOSPITAL | Age: 86
End: 2021-02-03

## 2021-02-03 ENCOUNTER — HOSPITAL ENCOUNTER (EMERGENCY)
Facility: HOSPITAL | Age: 86
Discharge: HOME | End: 2021-02-03
Attending: EMERGENCY MEDICINE | Admitting: EMERGENCY MEDICINE

## 2021-02-03 VITALS
DIASTOLIC BLOOD PRESSURE: 89 MMHG | SYSTOLIC BLOOD PRESSURE: 138 MMHG | RESPIRATION RATE: 16 BRPM | OXYGEN SATURATION: 94 % | TEMPERATURE: 98 F | HEART RATE: 85 BPM

## 2021-02-03 DIAGNOSIS — R06.00 DYSPNEA, UNSPECIFIED TYPE: Primary | ICD-10-CM

## 2021-02-03 DIAGNOSIS — N39.0 ACUTE UTI: ICD-10-CM

## 2021-02-03 LAB
ALBUMIN SERPL-MCNC: 3.2 G/DL (ref 3.5–5.2)
ALBUMIN/GLOB SERPL: 1.2 G/DL
ALP SERPL-CCNC: 96 U/L (ref 39–117)
ALT SERPL W P-5'-P-CCNC: 13 U/L (ref 1–41)
ANION GAP SERPL CALCULATED.3IONS-SCNC: 5.7 MMOL/L (ref 5–15)
AST SERPL-CCNC: 20 U/L (ref 1–40)
B PARAPERT DNA SPEC QL NAA+PROBE: NOT DETECTED
B PERT DNA SPEC QL NAA+PROBE: NOT DETECTED
BACTERIA UR QL AUTO: ABNORMAL /HPF
BASOPHILS # BLD AUTO: 0.07 10*3/MM3 (ref 0–0.2)
BASOPHILS NFR BLD AUTO: 0.6 % (ref 0–1.5)
BILIRUB SERPL-MCNC: 0.3 MG/DL (ref 0–1.2)
BILIRUB UR QL STRIP: NEGATIVE
BUN SERPL-MCNC: 17 MG/DL (ref 8–23)
BUN/CREAT SERPL: 17.2 (ref 7–25)
C PNEUM DNA NPH QL NAA+NON-PROBE: NOT DETECTED
CALCIUM SPEC-SCNC: 9.7 MG/DL (ref 8.6–10.5)
CHLORIDE SERPL-SCNC: 104 MMOL/L (ref 98–107)
CLARITY UR: ABNORMAL
CO2 SERPL-SCNC: 32.3 MMOL/L (ref 22–29)
COLOR UR: YELLOW
CREAT SERPL-MCNC: 0.99 MG/DL (ref 0.76–1.27)
DEPRECATED RDW RBC AUTO: 58 FL (ref 37–54)
EOSINOPHIL # BLD AUTO: 0.44 10*3/MM3 (ref 0–0.4)
EOSINOPHIL NFR BLD AUTO: 4.1 % (ref 0.3–6.2)
ERYTHROCYTE [DISTWIDTH] IN BLOOD BY AUTOMATED COUNT: 20.8 % (ref 12.3–15.4)
FLUAV SUBTYP SPEC NAA+PROBE: NOT DETECTED
FLUBV RNA ISLT QL NAA+PROBE: NOT DETECTED
GFR SERPL CREATININE-BSD FRML MDRD: 71 ML/MIN/1.73
GLOBULIN UR ELPH-MCNC: 2.7 GM/DL
GLUCOSE SERPL-MCNC: 108 MG/DL (ref 65–99)
GLUCOSE UR STRIP-MCNC: NEGATIVE MG/DL
HADV DNA SPEC NAA+PROBE: NOT DETECTED
HCOV 229E RNA SPEC QL NAA+PROBE: NOT DETECTED
HCOV HKU1 RNA SPEC QL NAA+PROBE: NOT DETECTED
HCOV NL63 RNA SPEC QL NAA+PROBE: NOT DETECTED
HCOV OC43 RNA SPEC QL NAA+PROBE: NOT DETECTED
HCT VFR BLD AUTO: 38.2 % (ref 37.5–51)
HGB BLD-MCNC: 11.5 G/DL (ref 13–17.7)
HGB UR QL STRIP.AUTO: NEGATIVE
HMPV RNA NPH QL NAA+NON-PROBE: NOT DETECTED
HOLD SPECIMEN: NORMAL
HOLD SPECIMEN: NORMAL
HPIV1 RNA SPEC QL NAA+PROBE: NOT DETECTED
HPIV2 RNA SPEC QL NAA+PROBE: NOT DETECTED
HPIV3 RNA NPH QL NAA+PROBE: NOT DETECTED
HPIV4 P GENE NPH QL NAA+PROBE: NOT DETECTED
HYALINE CASTS UR QL AUTO: ABNORMAL /LPF
IMM GRANULOCYTES # BLD AUTO: 0.05 10*3/MM3 (ref 0–0.05)
IMM GRANULOCYTES NFR BLD AUTO: 0.5 % (ref 0–0.5)
KETONES UR QL STRIP: NEGATIVE
LEUKOCYTE ESTERASE UR QL STRIP.AUTO: ABNORMAL
LYMPHOCYTES # BLD AUTO: 0.92 10*3/MM3 (ref 0.7–3.1)
LYMPHOCYTES NFR BLD AUTO: 8.5 % (ref 19.6–45.3)
M PNEUMO IGG SER IA-ACNC: NOT DETECTED
MCH RBC QN AUTO: 23.7 PG (ref 26.6–33)
MCHC RBC AUTO-ENTMCNC: 30.1 G/DL (ref 31.5–35.7)
MCV RBC AUTO: 78.6 FL (ref 79–97)
MONOCYTES # BLD AUTO: 0.95 10*3/MM3 (ref 0.1–0.9)
MONOCYTES NFR BLD AUTO: 8.8 % (ref 5–12)
NEUTROPHILS NFR BLD AUTO: 77.5 % (ref 42.7–76)
NEUTROPHILS NFR BLD AUTO: 8.38 10*3/MM3 (ref 1.7–7)
NITRITE UR QL STRIP: POSITIVE
NRBC BLD AUTO-RTO: 0 /100 WBC (ref 0–0.2)
NT-PROBNP SERPL-MCNC: 741 PG/ML (ref 0–1800)
PH UR STRIP.AUTO: 6 [PH] (ref 5–8)
PLATELET # BLD AUTO: 221 10*3/MM3 (ref 140–450)
PMV BLD AUTO: 9.7 FL (ref 6–12)
POTASSIUM SERPL-SCNC: 4.2 MMOL/L (ref 3.5–5.2)
PROT SERPL-MCNC: 5.9 G/DL (ref 6–8.5)
PROT UR QL STRIP: NEGATIVE
QT INTERVAL: 400 MS
RBC # BLD AUTO: 4.86 10*6/MM3 (ref 4.14–5.8)
RBC # UR: ABNORMAL /HPF
REF LAB TEST METHOD: ABNORMAL
RHINOVIRUS RNA SPEC NAA+PROBE: NOT DETECTED
RSV RNA NPH QL NAA+NON-PROBE: NOT DETECTED
SARS-COV-2 RNA NPH QL NAA+NON-PROBE: NOT DETECTED
SODIUM SERPL-SCNC: 142 MMOL/L (ref 136–145)
SP GR UR STRIP: 1.01 (ref 1–1.03)
SQUAMOUS #/AREA URNS HPF: ABNORMAL /HPF
TROPONIN T SERPL-MCNC: <0.01 NG/ML (ref 0–0.03)
UROBILINOGEN UR QL STRIP: ABNORMAL
WBC # BLD AUTO: 10.81 10*3/MM3 (ref 3.4–10.8)
WBC UR QL AUTO: ABNORMAL /HPF
WHOLE BLOOD HOLD SPECIMEN: NORMAL
WHOLE BLOOD HOLD SPECIMEN: NORMAL

## 2021-02-03 PROCEDURE — 71045 X-RAY EXAM CHEST 1 VIEW: CPT

## 2021-02-03 PROCEDURE — 81001 URINALYSIS AUTO W/SCOPE: CPT | Performed by: EMERGENCY MEDICINE

## 2021-02-03 PROCEDURE — 85025 COMPLETE CBC W/AUTO DIFF WBC: CPT

## 2021-02-03 PROCEDURE — 99284 EMERGENCY DEPT VISIT MOD MDM: CPT

## 2021-02-03 PROCEDURE — 0202U NFCT DS 22 TRGT SARS-COV-2: CPT | Performed by: EMERGENCY MEDICINE

## 2021-02-03 PROCEDURE — 80053 COMPREHEN METABOLIC PANEL: CPT | Performed by: EMERGENCY MEDICINE

## 2021-02-03 PROCEDURE — 93010 ELECTROCARDIOGRAM REPORT: CPT | Performed by: INTERNAL MEDICINE

## 2021-02-03 PROCEDURE — 36415 COLL VENOUS BLD VENIPUNCTURE: CPT

## 2021-02-03 PROCEDURE — 83880 ASSAY OF NATRIURETIC PEPTIDE: CPT | Performed by: EMERGENCY MEDICINE

## 2021-02-03 PROCEDURE — 84484 ASSAY OF TROPONIN QUANT: CPT | Performed by: EMERGENCY MEDICINE

## 2021-02-03 PROCEDURE — 93005 ELECTROCARDIOGRAM TRACING: CPT | Performed by: EMERGENCY MEDICINE

## 2021-02-03 PROCEDURE — 51798 US URINE CAPACITY MEASURE: CPT

## 2021-02-03 RX ORDER — SULFAMETHOXAZOLE AND TRIMETHOPRIM 800; 160 MG/1; MG/1
1 TABLET ORAL 2 TIMES DAILY
Qty: 14 TABLET | Refills: 0 | Status: SHIPPED | OUTPATIENT
Start: 2021-02-03

## 2021-02-03 RX ORDER — SODIUM CHLORIDE 0.9 % (FLUSH) 0.9 %
10 SYRINGE (ML) INJECTION AS NEEDED
Status: DISCONTINUED | OUTPATIENT
Start: 2021-02-03 | End: 2021-02-03 | Stop reason: HOSPADM

## 2021-02-03 NOTE — ED NOTES
Dr. Iyer called report for pt who will be arriving by PV. Pt was being seen by his PCP who wanted to send him to the ER for hypoxia on RA in the 80's. Pt was not tested for covid today. History of COPD, CHF and c/o SOA x 3 days. Pt denies known exposure to covid, fever or cough. Received the covid vaccine yesterday     Gianna Raymond RN  02/03/21 9460       Gianna Raymond RN  02/03/21 2109

## 2021-02-03 NOTE — ED TRIAGE NOTES
".Pt masked on arrival, staff masked    Pt sent from md office for low O2 sats in the office, not on oxygen at home, no known exposures, soa \"for a long time\"  "

## 2021-02-03 NOTE — ED NOTES
PATIENT TOLERATED AMBULATION WITH ASSISTANCE OF CANE. PATIENT OXYGEN SATURATION MAINTAINED 91% AND ABOVE. ED MD MADE AWARE.     Veda Liriano RN  02/03/21 3185

## 2021-02-03 NOTE — ED PROVIDER NOTES
EMERGENCY DEPARTMENT ENCOUNTER    Room Number:  HALD/D  Date of encounter:  2/4/2021  PCP: Idalia Iyer MD  Historian: Patient and family member by phone      HPI:  Chief Complaint: Dyspneic and reportedly hypoxic at the PCP office earlier today  A complete HPI/ROS/PMH/PSH/SH/FH are unobtainable due to: Very poor historian    Context: Macario Willingham is a 89 y.o. male who presents to the ED c/o chronic shortness of breath secondary to COPD and congestive heart failure.  Patient said he was following up with his PCP today, and they documented that his oxygen sats were in the 80s on room air according to their records.  He has not felt differently of late, but they sent him because of the O2 reading.  He is not had a fever or cough.  Patient self catheterizes for urine, and the daughter on the phone asked that we check his urine for infection, as he has a history of chronic recurrent UTIs.  He is followed by Dr. Chamorro from urology      PAST MEDICAL HISTORY  Active Ambulatory Problems     Diagnosis Date Noted   • Fall 07/13/2017   • Pacemaker, artificial 07/13/2017   • Ischemic cardiomyopathy 07/13/2017   • Urine retention 07/15/2017   • Urethral stricture 07/15/2017     Resolved Ambulatory Problems     Diagnosis Date Noted   • Sepsis due to urinary tract infection (CMS/HCC) 07/13/2017   • Syncope 07/13/2017   • Traumatic rhabdomyolysis (CMS/HCC) 07/13/2017     Past Medical History:   Diagnosis Date   • Arthritis    • Cancer (CMS/HCC)    • CHF (congestive heart failure) (CMS/HCC)    • COPD (chronic obstructive pulmonary disease) (CMS/HCC)    • Depression    • GERD (gastroesophageal reflux disease)    • Hypertension    • Myocardial infarction          PAST SURGICAL HISTORY  Past Surgical History:   Procedure Laterality Date   • CARDIAC SURGERY      stents   • HERNIA REPAIR     • PACEMAKER IMPLANTATION           FAMILY HISTORY  No family history on file.      SOCIAL HISTORY  Social History      Socioeconomic History   • Marital status:      Spouse name: Not on file   • Number of children: Not on file   • Years of education: Not on file   • Highest education level: Not on file   Tobacco Use   • Smoking status: Former Smoker   Substance and Sexual Activity   • Alcohol use: Yes     Comment: occ   • Drug use: No         ALLERGIES  Lisinopril and Penicillins        REVIEW OF SYSTEMS  Review of Systems   Limited due to very poor historian      PHYSICAL EXAM    I have reviewed the triage vital signs and nursing notes.    ED Triage Vitals   Temp Heart Rate Resp BP SpO2   02/03/21 1508 02/03/21 1508 02/03/21 1508 02/03/21 1513 02/03/21 1508   98 °F (36.7 °C) 96 20 105/67 95 %      Temp src Heart Rate Source Patient Position BP Location FiO2 (%)   -- -- -- -- --              Physical Exam  GENERAL: Awake and alert, nontoxic-appearing, no distress  HENT: nares patent, no JVD  EYES: no scleral icterus  CV: regular rhythm, regular rate  RESPIRATORY: normal effort, clear to auscultation bilaterally, no respiratory distress  ABDOMEN: soft, nontender palpation  MUSCULOSKELETAL: no deformity, no calf tenderness or pedal edema  NEURO: alert, moves all extremities, follows commands  SKIN: warm, dry        LAB RESULTS  Recent Results (from the past 24 hour(s))   Comprehensive Metabolic Panel    Collection Time: 02/03/21  4:20 PM    Specimen: Blood   Result Value Ref Range    Glucose 108 (H) 65 - 99 mg/dL    BUN 17 8 - 23 mg/dL    Creatinine 0.99 0.76 - 1.27 mg/dL    Sodium 142 136 - 145 mmol/L    Potassium 4.2 3.5 - 5.2 mmol/L    Chloride 104 98 - 107 mmol/L    CO2 32.3 (H) 22.0 - 29.0 mmol/L    Calcium 9.7 8.6 - 10.5 mg/dL    Total Protein 5.9 (L) 6.0 - 8.5 g/dL    Albumin 3.20 (L) 3.50 - 5.20 g/dL    ALT (SGPT) 13 1 - 41 U/L    AST (SGOT) 20 1 - 40 U/L    Alkaline Phosphatase 96 39 - 117 U/L    Total Bilirubin 0.3 0.0 - 1.2 mg/dL    eGFR Non African Amer 71 >60 mL/min/1.73    Globulin 2.7 gm/dL    A/G Ratio 1.2  g/dL    BUN/Creatinine Ratio 17.2 7.0 - 25.0    Anion Gap 5.7 5.0 - 15.0 mmol/L   BNP    Collection Time: 02/03/21  4:20 PM    Specimen: Blood   Result Value Ref Range    proBNP 741.0 0.0-1,800.0 pg/mL   Troponin    Collection Time: 02/03/21  4:20 PM    Specimen: Blood   Result Value Ref Range    Troponin T <0.010 0.000 - 0.030 ng/mL   Light Blue Top    Collection Time: 02/03/21  4:20 PM   Result Value Ref Range    Extra Tube hold for add-on    Green Top (Gel)    Collection Time: 02/03/21  4:20 PM   Result Value Ref Range    Extra Tube Hold for add-ons.    Lavender Top    Collection Time: 02/03/21  4:20 PM   Result Value Ref Range    Extra Tube hold for add-on    Gold Top - SST    Collection Time: 02/03/21  4:20 PM   Result Value Ref Range    Extra Tube Hold for add-ons.    CBC Auto Differential    Collection Time: 02/03/21  4:20 PM    Specimen: Blood   Result Value Ref Range    WBC 10.81 (H) 3.40 - 10.80 10*3/mm3    RBC 4.86 4.14 - 5.80 10*6/mm3    Hemoglobin 11.5 (L) 13.0 - 17.7 g/dL    Hematocrit 38.2 37.5 - 51.0 %    MCV 78.6 (L) 79.0 - 97.0 fL    MCH 23.7 (L) 26.6 - 33.0 pg    MCHC 30.1 (L) 31.5 - 35.7 g/dL    RDW 20.8 (H) 12.3 - 15.4 %    RDW-SD 58.0 (H) 37.0 - 54.0 fl    MPV 9.7 6.0 - 12.0 fL    Platelets 221 140 - 450 10*3/mm3    Neutrophil % 77.5 (H) 42.7 - 76.0 %    Lymphocyte % 8.5 (L) 19.6 - 45.3 %    Monocyte % 8.8 5.0 - 12.0 %    Eosinophil % 4.1 0.3 - 6.2 %    Basophil % 0.6 0.0 - 1.5 %    Immature Grans % 0.5 0.0 - 0.5 %    Neutrophils, Absolute 8.38 (H) 1.70 - 7.00 10*3/mm3    Lymphocytes, Absolute 0.92 0.70 - 3.10 10*3/mm3    Monocytes, Absolute 0.95 (H) 0.10 - 0.90 10*3/mm3    Eosinophils, Absolute 0.44 (H) 0.00 - 0.40 10*3/mm3    Basophils, Absolute 0.07 0.00 - 0.20 10*3/mm3    Immature Grans, Absolute 0.05 0.00 - 0.05 10*3/mm3    nRBC 0.0 0.0 - 0.2 /100 WBC   ECG 12 Lead    Collection Time: 02/03/21  4:45 PM   Result Value Ref Range    QT Interval 400 ms   Respiratory Panel PCR  w/COVID-19(SARS-CoV-2) MARTA/TAM/ÁNGELA/PAD/COR/MAD/RADHA In-House, NP Swab in UTM/VTM, 3-4 HR TAT - Swab, Nasopharynx    Collection Time: 02/03/21  4:48 PM    Specimen: Nasopharynx; Swab   Result Value Ref Range    ADENOVIRUS, PCR Not Detected Not Detected    Coronavirus 229E Not Detected Not Detected    Coronavirus HKU1 Not Detected Not Detected    Coronavirus NL63 Not Detected Not Detected    Coronavirus OC43 Not Detected Not Detected    COVID19 Not Detected Not Detected - Ref. Range    Human Metapneumovirus Not Detected Not Detected    Human Rhinovirus/Enterovirus Not Detected Not Detected    Influenza A PCR Not Detected Not Detected    Influenza B PCR Not Detected Not Detected    Parainfluenza Virus 1 Not Detected Not Detected    Parainfluenza Virus 2 Not Detected Not Detected    Parainfluenza Virus 3 Not Detected Not Detected    Parainfluenza Virus 4 Not Detected Not Detected    RSV, PCR Not Detected Not Detected    Bordetella pertussis pcr Not Detected Not Detected    Bordetella parapertussis PCR Not Detected Not Detected    Chlamydophila pneumoniae PCR Not Detected Not Detected    Mycoplasma pneumo by PCR Not Detected Not Detected   Urinalysis With Microscopic If Indicated (No Culture) - Urine, Clean Catch    Collection Time: 02/03/21  5:39 PM    Specimen: Urine, Clean Catch   Result Value Ref Range    Color, UA Yellow Yellow, Straw    Appearance, UA Cloudy (A) Clear    pH, UA 6.0 5.0 - 8.0    Specific Gravity, UA 1.015 1.005 - 1.030    Glucose, UA Negative Negative    Ketones, UA Negative Negative    Bilirubin, UA Negative Negative    Blood, UA Negative Negative    Protein, UA Negative Negative    Leuk Esterase, UA Moderate (2+) (A) Negative    Nitrite, UA Positive (A) Negative    Urobilinogen, UA 1.0 E.U./dL 0.2 - 1.0 E.U./dL   Urinalysis, Microscopic Only - Urine, Clean Catch    Collection Time: 02/03/21  5:39 PM    Specimen: Urine, Clean Catch   Result Value Ref Range    RBC, UA None Seen None Seen, 0-2 /HPF     WBC, UA Too Numerous to Count (A) None Seen, 0-2 /HPF    Bacteria, UA 3+ (A) None Seen /HPF    Squamous Epithelial Cells, UA 3-6 (A) None Seen, 0-2 /HPF    Hyaline Casts, UA 3-6 None Seen /LPF    Methodology Manual Light Microscopy        Ordered the above labs and independently reviewed the results.        RADIOLOGY  Xr Chest 1 View    Result Date: 2/3/2021  HISTORY: Shortness of breath  COMPARISON: 07/13/2017  1 view(s) obtained.  FINDINGS: Lungs are well expanded. Stable mild chronic interstitial opacities. No acute appearing infiltrate. No pneumothorax. Heart size stable. Stable ICD. Hiatal hernia.      No acute findings  This report was finalized on 2/3/2021 5:01 PM by Dr. Cesar Espinosa M.D.        I ordered the above noted radiological studies. Reviewed by me and discussed with radiologist.  See dictation for official radiology interpretation.      PROCEDURES    Procedures      MEDICATIONS GIVEN IN ER    Medications - No data to display      PROGRESS, DATA ANALYSIS, CONSULTS, AND MEDICAL DECISION MAKING    All labs have been independently reviewed by me.  All radiology studies have been reviewed by me and discussed with radiologist dictating the report.   EKG's independently viewed and interpreted by me.  Discussion below represents my analysis of pertinent findings related to patient's condition, differential diagnosis, treatment plan and final disposition.        ED Course as of Feb 04 1524   Thu Feb 04, 2021   1522 CBC and chemistry are fairly unremarkable    [DP]   1522 Respiratory viral panel with Covid swab negative    [DP]   1523 Urinalysis shows 3+ bacteria and too numerous to count white cells.    [DP]   1523 Bladder scan showed only 100 cc residual    [DP]   1523 EKG at 1726  Atrial paced at 90  No acute ST segment elevations or depressions to suggest ischemia, and this is not significantly changed when compared to previous from 2017    [DP]   1523 Chest x-ray shows no active disease    [DP]   1523  We have not had any oxygen saturations in the ED less than 90%.  In fact at rest he is in the mid 90s on room air, and when he walked around the ED his sats never dropped below 92% on room air.  I am not sure if this was a faulty reading in the office, but this all appears to be his baseline.  I have cultured the urine and discharge him home on some antibiotics.    [DP]      ED Course User Index  [DP] Zane Hermosillo MD           PPE: Both the patient and I wore a surgical mask throughout the entire patient encounter. I wore protective goggles, gown and gloves    AS OF 15:24 EST VITALS:    BP - 138/89  HR - 85  TEMP - 98 °F (36.7 °C)  O2 SATS - 94%        DIAGNOSIS  Final diagnoses:   Dyspnea, unspecified type   Acute UTI         DISPOSITION  Discharge           Zane Hermosillo MD  02/04/21 [id number]

## 2021-02-03 NOTE — ED NOTES
Rodolfo Willingham, daughter, may be called for questions/updates, [phone]; would also like pt checked for UTI     Husam Silva, RN  02/03/21 1513       Husam Silva, RN  02/03/21 151